# Patient Record
Sex: MALE | Race: WHITE | NOT HISPANIC OR LATINO | Employment: OTHER | URBAN - METROPOLITAN AREA
[De-identification: names, ages, dates, MRNs, and addresses within clinical notes are randomized per-mention and may not be internally consistent; named-entity substitution may affect disease eponyms.]

---

## 2023-03-08 DIAGNOSIS — F84.0 AUTISM SPECTRUM DISORDER (HHS-HCC): ICD-10-CM

## 2023-03-08 RX ORDER — GUANFACINE 2 MG/1
2 TABLET, EXTENDED RELEASE ORAL DAILY
Qty: 90 TABLET | Refills: 0 | Status: SHIPPED | OUTPATIENT
Start: 2023-03-08 | End: 2023-03-22 | Stop reason: SDUPTHER

## 2023-03-08 RX ORDER — GUANFACINE 2 MG/1
2 TABLET, EXTENDED RELEASE ORAL DAILY
COMMUNITY
End: 2023-03-08 | Stop reason: SDUPTHER

## 2023-03-13 PROBLEM — F84.0 AUTISM SPECTRUM DISORDER (HHS-HCC): Status: ACTIVE | Noted: 2023-03-13

## 2023-03-22 ENCOUNTER — TELEMEDICINE (OUTPATIENT)
Dept: PEDIATRICS | Facility: CLINIC | Age: 23
End: 2023-03-22
Payer: COMMERCIAL

## 2023-03-22 DIAGNOSIS — F84.0 AUTISM SPECTRUM DISORDER (HHS-HCC): ICD-10-CM

## 2023-03-22 PROCEDURE — 99213 OFFICE O/P EST LOW 20 MIN: CPT | Performed by: PEDIATRICS

## 2023-03-22 RX ORDER — GUANFACINE 2 MG/1
2 TABLET, EXTENDED RELEASE ORAL DAILY
Qty: 90 TABLET | Refills: 1 | Status: SHIPPED | OUTPATIENT
Start: 2023-03-22 | End: 2023-11-20

## 2023-03-22 NOTE — PROGRESS NOTES
Subjective   Patient ID: Juliocesar Walker is a 23 y.o. male, with  ASD  for which he is treated with  Guanfacine 2mg once daily , who presents today for follow-up via video call.  He is accompanied by his mother..    HPI:  Juliocesar and his mother report that Juliocesar is doing very well on this dose of medication.  He is currently exploring an adult day program at [facility] to perhaps start in the spring.   He is undergoing a waiting process for funding for the program.   The mother is pleased with how he is doing.   He is a little bored because his program at [facility] ended when he turned 23 years old.       Objective   There were no vitals taken for this visit.  Physical Exam  Constitutional:       Appearance: Normal appearance.   HENT:      Nose: Nose normal.   Eyes:      Pupils: Pupils are equal, round, and reactive to light.   Pulmonary:      Effort: Pulmonary effort is normal. No respiratory distress.   Neurological:      Mental Status: He is alert.       Assessment/Plan   Diagnoses and all orders for this visit:  Autism spectrum disorder

## 2023-08-31 ENCOUNTER — OFFICE VISIT (OUTPATIENT)
Dept: PRIMARY CARE | Facility: CLINIC | Age: 23
End: 2023-08-31
Payer: COMMERCIAL

## 2023-08-31 VITALS — SYSTOLIC BLOOD PRESSURE: 112 MMHG | BODY MASS INDEX: 26.19 KG/M2 | DIASTOLIC BLOOD PRESSURE: 60 MMHG | WEIGHT: 161 LBS

## 2023-08-31 DIAGNOSIS — F84.0 AUTISM SPECTRUM DISORDER (HHS-HCC): ICD-10-CM

## 2023-08-31 DIAGNOSIS — F84.0 AUTISM (HHS-HCC): ICD-10-CM

## 2023-08-31 DIAGNOSIS — F41.0 PANIC ATTACK: ICD-10-CM

## 2023-08-31 DIAGNOSIS — G40.909 NONINTRACTABLE EPILEPSY WITHOUT STATUS EPILEPTICUS, UNSPECIFIED EPILEPSY TYPE (MULTI): Primary | ICD-10-CM

## 2023-08-31 PROCEDURE — 99204 OFFICE O/P NEW MOD 45 MIN: CPT | Performed by: STUDENT IN AN ORGANIZED HEALTH CARE EDUCATION/TRAINING PROGRAM

## 2023-08-31 PROCEDURE — 1036F TOBACCO NON-USER: CPT | Performed by: STUDENT IN AN ORGANIZED HEALTH CARE EDUCATION/TRAINING PROGRAM

## 2023-08-31 RX ORDER — HYDROXYZINE HYDROCHLORIDE 25 MG/1
25 TABLET, FILM COATED ORAL 2 TIMES DAILY
Qty: 60 TABLET | Refills: 0 | Status: SHIPPED | OUTPATIENT
Start: 2023-08-31 | End: 2023-08-31 | Stop reason: SDUPTHER

## 2023-08-31 RX ORDER — HYDROXYZINE HYDROCHLORIDE 25 MG/1
25 TABLET, FILM COATED ORAL 2 TIMES DAILY PRN
Qty: 60 TABLET | Refills: 0 | Status: SHIPPED | OUTPATIENT
Start: 2023-08-31 | End: 2023-09-26

## 2023-08-31 ASSESSMENT — ENCOUNTER SYMPTOMS
CARDIOVASCULAR NEGATIVE: 1
PSYCHIATRIC NEGATIVE: 1
RESPIRATORY NEGATIVE: 1
CONSTITUTIONAL NEGATIVE: 1
GASTROINTESTINAL NEGATIVE: 1
MUSCULOSKELETAL NEGATIVE: 1
NEUROLOGICAL NEGATIVE: 1

## 2023-08-31 NOTE — PROGRESS NOTES
Establish as a new patient    Subjective   Patient ID: Juliocesar Walker is a 23 y.o. male.    Patient is a 23-year-old male who presents for [facility] care.  Past medical history includes autism, otherwise no other medical history.  Mom is present with patient who provides most of history.  States patient is doing overall well, mood overall stable, does not follow with a psychiatrist.  Was prescribed guanfacine from his previous PCP, and symptoms have been doing overall well with regards to this medication.  Mother also reports that patient does have outburst at times, does become fixated on a variety of things.  Otherwise this is infrequent, but overall stable.  He regards no additional issues or concerns.      Past medical history as above  Past surgical history denies  Social history denies any alcohol drug or tobacco use  Family history noncontributory                Review of Systems   Constitutional: Negative.    HENT: Negative.     Respiratory: Negative.     Cardiovascular: Negative.    Gastrointestinal: Negative.    Musculoskeletal: Negative.    Skin: Negative.    Neurological: Negative.    Psychiatric/Behavioral: Negative.         Objective      Visit Vitals  /60   Wt 73 kg (161 lb)   BMI 26.19 kg/m²   Smoking Status Never   BSA 1.84 m²      Physical Exam  Vitals and nursing note reviewed.   Constitutional:       General: He is not in acute distress.     Appearance: Normal appearance.   HENT:      Mouth/Throat:      Mouth: Mucous membranes are moist.      Pharynx: Oropharynx is clear. No oropharyngeal exudate.   Eyes:      Extraocular Movements: Extraocular movements intact.      Pupils: Pupils are equal, round, and reactive to light.   Cardiovascular:      Rate and Rhythm: Normal rate and regular rhythm.      Pulses: Normal pulses.      Heart sounds: Normal heart sounds. No murmur heard.  Pulmonary:      Effort: Pulmonary effort is normal. No respiratory distress.   Abdominal:      General: Abdomen is  flat. Bowel sounds are normal. There is no distension.      Tenderness: There is no abdominal tenderness.   Musculoskeletal:         General: Normal range of motion.      Right lower leg: No edema.      Left lower leg: No edema.   Skin:     General: Skin is warm and dry.      Capillary Refill: Capillary refill takes less than 2 seconds.   Neurological:      General: No focal deficit present.      Mental Status: He is alert. Mental status is at baseline.      Cranial Nerves: No cranial nerve deficit.      Motor: No weakness.   Psychiatric:         Mood and Affect: Mood normal.         Thought Content: Thought content normal.         Assessment/Plan   Diagnoses and all orders for this visit:  Nonintractable epilepsy without status epilepticus, unspecified epilepsy type (CMS/HCC)  -     Referral to Neurology; Future  Autism  -     Referral to Psychiatry; Future  -     Referral to [facility]; Future  Panic attack  -     hydrOXYzine HCL (Atarax) 25 mg tablet; Take 1 tablet (25 mg) by mouth 2 times a day as needed for anxiety.  Autism spectrum disorder    23-year-old male here to establish care    #Autism  Referral [facility] psych and [facility]  Continue medications from previous provider  Closely monitor symptoms  Defer lab work at this time    #Panic attacks  Intermittently gets episodes of panic attacks and agitation, hydroxyzine as needed, referrals as above    #Health maintenance  Advise age-appropriate vaccinations  Referral to psychiatry    Return to care in 3 to 6 months mother updated with plan of care

## 2023-10-16 DIAGNOSIS — F84.0 AUTISM SPECTRUM DISORDER (HHS-HCC): ICD-10-CM

## 2023-10-16 RX ORDER — GUANFACINE 2 MG/1
2 TABLET, EXTENDED RELEASE ORAL DAILY
Qty: 90 TABLET | Refills: 1 | OUTPATIENT
Start: 2023-10-16

## 2023-11-18 DIAGNOSIS — F84.0 AUTISM SPECTRUM DISORDER (HHS-HCC): ICD-10-CM

## 2023-11-20 RX ORDER — GUANFACINE 2 MG/1
2 TABLET, EXTENDED RELEASE ORAL DAILY
Qty: 90 TABLET | Refills: 1 | Status: SHIPPED | OUTPATIENT
Start: 2023-11-20 | End: 2023-12-15

## 2023-12-15 DIAGNOSIS — F84.0 AUTISM SPECTRUM DISORDER (HHS-HCC): ICD-10-CM

## 2023-12-15 RX ORDER — GUANFACINE 2 MG/1
2 TABLET, EXTENDED RELEASE ORAL DAILY
Qty: 30 TABLET | Refills: 8 | Status: SHIPPED | OUTPATIENT
Start: 2023-12-15 | End: 2024-01-01 | Stop reason: SDUPTHER

## 2023-12-28 ENCOUNTER — TELEPHONE (OUTPATIENT)
Dept: PRIMARY CARE | Facility: CLINIC | Age: 23
End: 2023-12-28
Payer: COMMERCIAL

## 2023-12-28 DIAGNOSIS — F84.0 AUTISM SPECTRUM DISORDER (HHS-HCC): ICD-10-CM

## 2023-12-28 NOTE — TELEPHONE ENCOUNTER
Looking to get a refill for 2 mg guanfacine [facility] er 24 hour tablet.  Will also need you to redo some paperwork that wasn't filled out correctly but will fax that over

## 2024-01-01 RX ORDER — GUANFACINE 2 MG/1
2 TABLET, EXTENDED RELEASE ORAL DAILY
Qty: 90 TABLET | Refills: 0 | Status: SHIPPED | OUTPATIENT
Start: 2024-01-01 | End: 2024-05-16 | Stop reason: HOSPADM

## 2024-02-16 ENCOUNTER — APPOINTMENT (OUTPATIENT)
Dept: PRIMARY CARE | Facility: CLINIC | Age: 24
End: 2024-02-16
Payer: COMMERCIAL

## 2024-02-16 ENCOUNTER — HOSPITAL ENCOUNTER (EMERGENCY)
Facility: HOSPITAL | Age: 24
Discharge: STILL ADMITTED OR ADMINISTRATIVE | End: 2024-02-16
Payer: COMMERCIAL

## 2024-02-16 ENCOUNTER — APPOINTMENT (OUTPATIENT)
Dept: CARDIOLOGY | Facility: HOSPITAL | Age: 24
End: 2024-02-16
Payer: COMMERCIAL

## 2024-02-16 ENCOUNTER — HOSPITAL ENCOUNTER (EMERGENCY)
Facility: HOSPITAL | Age: 24
Discharge: OTHER INSTITUTION | End: 2024-02-17
Attending: EMERGENCY MEDICINE
Payer: COMMERCIAL

## 2024-02-16 DIAGNOSIS — R46.89 AGGRESSIVE BEHAVIOR: Primary | ICD-10-CM

## 2024-02-16 LAB
ALBUMIN SERPL BCP-MCNC: 4.7 G/DL (ref 3.4–5)
ALP SERPL-CCNC: 54 U/L (ref 33–120)
ALT SERPL W P-5'-P-CCNC: 33 U/L (ref 10–52)
AMPHETAMINES UR QL SCN: NORMAL
ANION GAP SERPL CALC-SCNC: 14 MMOL/L (ref 10–20)
APAP SERPL-MCNC: <10 UG/ML
AST SERPL W P-5'-P-CCNC: 22 U/L (ref 9–39)
BARBITURATES UR QL SCN: NORMAL
BASOPHILS # BLD AUTO: 0.04 X10*3/UL (ref 0–0.1)
BASOPHILS NFR BLD AUTO: 0.5 %
BENZODIAZ UR QL SCN: NORMAL
BILIRUB SERPL-MCNC: 0.7 MG/DL (ref 0–1.2)
BUN SERPL-MCNC: 10 MG/DL (ref 6–23)
BZE UR QL SCN: NORMAL
CALCIUM SERPL-MCNC: 9.7 MG/DL (ref 8.6–10.3)
CANNABINOIDS UR QL SCN: NORMAL
CHLORIDE SERPL-SCNC: 105 MMOL/L (ref 98–107)
CO2 SERPL-SCNC: 26 MMOL/L (ref 21–32)
CREAT SERPL-MCNC: 0.89 MG/DL (ref 0.5–1.3)
EGFRCR SERPLBLD CKD-EPI 2021: >90 ML/MIN/1.73M*2
EOSINOPHIL # BLD AUTO: 0.13 X10*3/UL (ref 0–0.7)
EOSINOPHIL NFR BLD AUTO: 1.7 %
ERYTHROCYTE [DISTWIDTH] IN BLOOD BY AUTOMATED COUNT: 11.9 % (ref 11.5–14.5)
ETHANOL SERPL-MCNC: <10 MG/DL
FENTANYL+NORFENTANYL UR QL SCN: NORMAL
GLUCOSE SERPL-MCNC: 102 MG/DL (ref 74–99)
HCT VFR BLD AUTO: 44.5 % (ref 41–52)
HGB BLD-MCNC: 15.5 G/DL (ref 13.5–17.5)
IMM GRANULOCYTES # BLD AUTO: 0.04 X10*3/UL (ref 0–0.7)
IMM GRANULOCYTES NFR BLD AUTO: 0.5 % (ref 0–0.9)
LYMPHOCYTES # BLD AUTO: 1.63 X10*3/UL (ref 1.2–4.8)
LYMPHOCYTES NFR BLD AUTO: 21.1 %
MCH RBC QN AUTO: 30 PG (ref 26–34)
MCHC RBC AUTO-ENTMCNC: 34.8 G/DL (ref 32–36)
MCV RBC AUTO: 86 FL (ref 80–100)
MONOCYTES # BLD AUTO: 0.6 X10*3/UL (ref 0.1–1)
MONOCYTES NFR BLD AUTO: 7.8 %
NEUTROPHILS # BLD AUTO: 5.3 X10*3/UL (ref 1.2–7.7)
NEUTROPHILS NFR BLD AUTO: 68.4 %
NRBC BLD-RTO: 0 /100 WBCS (ref 0–0)
OPIATES UR QL SCN: NORMAL
OXYCODONE+OXYMORPHONE UR QL SCN: NORMAL
PCP UR QL SCN: NORMAL
PLATELET # BLD AUTO: 250 X10*3/UL (ref 150–450)
POTASSIUM SERPL-SCNC: 4.2 MMOL/L (ref 3.5–5.3)
PROT SERPL-MCNC: 7.2 G/DL (ref 6.4–8.2)
RBC # BLD AUTO: 5.16 X10*6/UL (ref 4.5–5.9)
SALICYLATES SERPL-MCNC: <3 MG/DL
SODIUM SERPL-SCNC: 141 MMOL/L (ref 136–145)
WBC # BLD AUTO: 7.7 X10*3/UL (ref 4.4–11.3)

## 2024-02-16 PROCEDURE — 80307 DRUG TEST PRSMV CHEM ANLYZR: CPT | Performed by: EMERGENCY MEDICINE

## 2024-02-16 PROCEDURE — 2500000001 HC RX 250 WO HCPCS SELF ADMINISTERED DRUGS (ALT 637 FOR MEDICARE OP): Performed by: EMERGENCY MEDICINE

## 2024-02-16 PROCEDURE — 4500999001 HC ED NO CHARGE

## 2024-02-16 PROCEDURE — 99285 EMERGENCY DEPT VISIT HI MDM: CPT | Mod: 25 | Performed by: EMERGENCY MEDICINE

## 2024-02-16 PROCEDURE — 85025 COMPLETE CBC W/AUTO DIFF WBC: CPT | Performed by: EMERGENCY MEDICINE

## 2024-02-16 PROCEDURE — 80320 DRUG SCREEN QUANTALCOHOLS: CPT | Performed by: EMERGENCY MEDICINE

## 2024-02-16 PROCEDURE — 93005 ELECTROCARDIOGRAM TRACING: CPT

## 2024-02-16 PROCEDURE — 80053 COMPREHEN METABOLIC PANEL: CPT | Performed by: EMERGENCY MEDICINE

## 2024-02-16 PROCEDURE — 36415 COLL VENOUS BLD VENIPUNCTURE: CPT | Performed by: EMERGENCY MEDICINE

## 2024-02-16 PROCEDURE — 99283 EMERGENCY DEPT VISIT LOW MDM: CPT

## 2024-02-16 RX ORDER — GUANFACINE 1 MG/1
2 TABLET ORAL ONCE
Status: COMPLETED | OUTPATIENT
Start: 2024-02-16 | End: 2024-02-16

## 2024-02-16 RX ORDER — RISPERIDONE 0.5 MG/1
0.5 TABLET, ORALLY DISINTEGRATING ORAL ONCE AS NEEDED
Status: COMPLETED | OUTPATIENT
Start: 2024-02-16 | End: 2024-02-16

## 2024-02-16 RX ADMIN — GUANFACINE 2 MG: 1 TABLET ORAL at 20:40

## 2024-02-16 RX ADMIN — RISPERIDONE 0.5 MG: 0.5 TABLET, ORALLY DISINTEGRATING ORAL at 22:27

## 2024-02-16 SDOH — SOCIAL STABILITY: SOCIAL NETWORK: PARENT/GUARDIAN/SIGNIFICANT OTHER INVOLVEMENT: OTHER (COMMENT)

## 2024-02-16 SDOH — HEALTH STABILITY: MENTAL HEALTH: BEHAVIORS/MOOD: ANXIOUS

## 2024-02-16 SDOH — SOCIAL STABILITY: SOCIAL INSECURITY: FAMILY BEHAVIORS: ANXIOUS

## 2024-02-16 ASSESSMENT — PAIN SCALES - GENERAL
PAINLEVEL_OUTOF10: 0 - NO PAIN
PAINLEVEL_OUTOF10: 0 - NO PAIN

## 2024-02-16 ASSESSMENT — PAIN - FUNCTIONAL ASSESSMENT
PAIN_FUNCTIONAL_ASSESSMENT: 0-10
PAIN_FUNCTIONAL_ASSESSMENT: 0-10

## 2024-02-16 ASSESSMENT — PAIN DESCRIPTION - PROGRESSION: CLINICAL_PROGRESSION: NOT CHANGED

## 2024-02-16 ASSESSMENT — LIFESTYLE VARIABLES
EVER FELT BAD OR GUILTY ABOUT YOUR DRINKING: NO
EVER HAD A DRINK FIRST THING IN THE MORNING TO STEADY YOUR NERVES TO GET RID OF A HANGOVER: NO
HAVE YOU EVER FELT YOU SHOULD CUT DOWN ON YOUR DRINKING: NO
HAVE PEOPLE ANNOYED YOU BY CRITICIZING YOUR DRINKING: NO
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
## 2024-02-17 VITALS
BODY MASS INDEX: 25.71 KG/M2 | HEIGHT: 66 IN | SYSTOLIC BLOOD PRESSURE: 124 MMHG | OXYGEN SATURATION: 98 % | DIASTOLIC BLOOD PRESSURE: 87 MMHG | HEART RATE: 80 BPM | RESPIRATION RATE: 18 BRPM | TEMPERATURE: 98.1 F | WEIGHT: 160 LBS

## 2024-02-17 LAB — SARS-COV-2 RNA RESP QL NAA+PROBE: NOT DETECTED

## 2024-02-17 PROCEDURE — 2500000001 HC RX 250 WO HCPCS SELF ADMINISTERED DRUGS (ALT 637 FOR MEDICARE OP): Performed by: STUDENT IN AN ORGANIZED HEALTH CARE EDUCATION/TRAINING PROGRAM

## 2024-02-17 PROCEDURE — 2500000001 HC RX 250 WO HCPCS SELF ADMINISTERED DRUGS (ALT 637 FOR MEDICARE OP): Performed by: INTERNAL MEDICINE

## 2024-02-17 PROCEDURE — 87635 SARS-COV-2 COVID-19 AMP PRB: CPT | Performed by: EMERGENCY MEDICINE

## 2024-02-17 RX ORDER — HYDROXYZINE HYDROCHLORIDE 25 MG/1
25 TABLET, FILM COATED ORAL ONCE
Status: COMPLETED | OUTPATIENT
Start: 2024-02-17 | End: 2024-02-17

## 2024-02-17 RX ORDER — LORAZEPAM 0.5 MG/1
1 TABLET ORAL ONCE
Status: COMPLETED | OUTPATIENT
Start: 2024-02-17 | End: 2024-02-17

## 2024-02-17 RX ADMIN — LORAZEPAM 1 MG: 0.5 TABLET ORAL at 11:50

## 2024-02-17 RX ADMIN — HYDROXYZINE HYDROCHLORIDE 25 MG: 25 TABLET ORAL at 01:33

## 2024-02-17 SDOH — HEALTH STABILITY: MENTAL HEALTH: BEHAVIORS/MOOD: SLEEPING

## 2024-02-17 SDOH — HEALTH STABILITY: MENTAL HEALTH: BEHAVIORS/MOOD: COOPERATIVE

## 2024-02-17 SDOH — SOCIAL STABILITY: SOCIAL NETWORK: VISITOR BEHAVIORS: UNABLE TO ASSESS

## 2024-02-17 SDOH — HEALTH STABILITY: MENTAL HEALTH: HALLUCINATION: UNABLE TO ASSESS

## 2024-02-17 SDOH — SOCIAL STABILITY: SOCIAL NETWORK: EMOTIONAL SUPPORT GIVEN: OTHER (COMMENT)

## 2024-02-17 SDOH — HEALTH STABILITY: MENTAL HEALTH: CONTENT: UNREMARKABLE

## 2024-02-17 SDOH — HEALTH STABILITY: MENTAL HEALTH: NEEDS EXPRESSED: DENIES

## 2024-02-17 SDOH — SOCIAL STABILITY: SOCIAL INSECURITY: FAMILY BEHAVIORS: UNABLE TO ASSESS

## 2024-02-17 SDOH — HEALTH STABILITY: MENTAL HEALTH: SLEEP PATTERN: SLEEPS ALL NIGHT

## 2024-02-17 SDOH — HEALTH STABILITY: MENTAL HEALTH: BEHAVIORS/MOOD: ANXIOUS;PACING

## 2024-02-17 SDOH — HEALTH STABILITY: MENTAL HEALTH: CONTENT: UNABLE TO ASSESS

## 2024-02-17 SDOH — SOCIAL STABILITY: SOCIAL NETWORK: VISITOR BEHAVIORS: OTHER (COMMENT)

## 2024-02-17 SDOH — SOCIAL STABILITY: SOCIAL INSECURITY: FAMILY BEHAVIORS: APPROPRIATE FOR SITUATION

## 2024-02-17 SDOH — HEALTH STABILITY: MENTAL HEALTH: SUICIDE ASSESSMENT: ADULT (C-SSRS)

## 2024-02-17 SDOH — SOCIAL STABILITY: SOCIAL NETWORK: PARENT/GUARDIAN/SIGNIFICANT OTHER INVOLVEMENT: OTHER (COMMENT)

## 2024-02-17 SDOH — SOCIAL STABILITY: SOCIAL NETWORK: PARENT/GUARDIAN/SIGNIFICANT OTHER INVOLVEMENT: ATTENTIVE TO PATIENT NEEDS

## 2024-02-17 SDOH — HEALTH STABILITY: MENTAL HEALTH: HAVE YOU WISHED YOU WERE DEAD OR WISHED YOU COULD GO TO SLEEP AND NOT WAKE UP?: NO

## 2024-02-17 SDOH — HEALTH STABILITY: MENTAL HEALTH: HAVE YOU ACTUALLY HAD ANY THOUGHTS OF KILLING YOURSELF?: NO

## 2024-02-17 SDOH — HEALTH STABILITY: MENTAL HEALTH: DELUSIONS: CONTROLLED

## 2024-02-17 SDOH — HEALTH STABILITY: MENTAL HEALTH: HAVE YOU EVER DONE ANYTHING, STARTED TO DO ANYTHING, OR PREPARED TO DO ANYTHING TO END YOUR LIFE?: NO

## 2024-02-17 SDOH — HEALTH STABILITY: MENTAL HEALTH: BEHAVIORS/MOOD: COOPERATIVE;CALM

## 2024-02-17 SDOH — HEALTH STABILITY: MENTAL HEALTH: BEHAVIORS/MOOD: APPROPRIATE FOR AGE

## 2024-02-17 SDOH — HEALTH STABILITY: MENTAL HEALTH: BEHAVIORS/MOOD: CALM

## 2024-02-17 SDOH — HEALTH STABILITY: MENTAL HEALTH

## 2024-02-17 ASSESSMENT — PAIN - FUNCTIONAL ASSESSMENT
PAIN_FUNCTIONAL_ASSESSMENT: 0-10
PAIN_FUNCTIONAL_ASSESSMENT: 0-10

## 2024-02-17 ASSESSMENT — PAIN SCALES - GENERAL
PAINLEVEL_OUTOF10: 0 - NO PAIN
PAINLEVEL_OUTOF10: 0 - NO PAIN
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
## 2024-02-17 NOTE — PROGRESS NOTES
Pharmacy Medication History Review    Juliocesar Walker is a 24 y.o. male admitted for No Principal Problem: There is no principal problem currently on the Problem List. Please update the Problem List and refresh.. Pharmacy reviewed the patient's [id number] medications and allergies for accuracy.    The list below reflectives the updated PTA list. Please review each medication in order reconciliation for additional clarification and justification.  Prior to Admission medications    Medication Sig Start Date End Date Taking? Authorizing Provider   guanFACINE (Intuniv) 2 mg 24 hr tablet Take 1 tablet (2 mg) by mouth once daily. 1/1/24 3/31/24 Yes Marco Thompson,    hydrOXYzine HCL (Atarax) 25 mg tablet TAKE 1 TABLET BY MOUTH TWICE A DAY  Patient taking differently: Take 1 tablet (25 mg) by mouth 2 times a day as needed for anxiety. 9/26/23  No Marco Thompson DO        The list below reflectives the updated allergy list. Please review each documented allergy for additional clarification and justification.  Allergies  Reviewed by Cammie Brown RN on 2/16/2024   No Known Allergies         Below are additional concerns with the patient's PTA list.        Karissa Marion CPhT
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
## 2024-02-24 LAB
ATRIAL RATE: 59 BPM
P AXIS: 58 DEGREES
P OFFSET: 193 MS
P ONSET: 142 MS
PR INTERVAL: 160 MS
Q ONSET: 222 MS
QRS COUNT: 9 BEATS
QRS DURATION: 88 MS
QT INTERVAL: 390 MS
QTC CALCULATION(BAZETT): 386 MS
QTC FREDERICIA: 388 MS
R AXIS: -8 DEGREES
T AXIS: 33 DEGREES
T OFFSET: 417 MS
VENTRICULAR RATE: 59 BPM

## 2024-04-03 ENCOUNTER — HOSPITAL ENCOUNTER (OUTPATIENT)
Dept: CARDIOLOGY | Facility: HOSPITAL | Age: 24
Discharge: HOME | End: 2024-04-03
Payer: COMMERCIAL

## 2024-04-03 ENCOUNTER — HOSPITAL ENCOUNTER (EMERGENCY)
Facility: HOSPITAL | Age: 24
Discharge: OTHER INSTITUTION | End: 2024-04-04
Attending: STUDENT IN AN ORGANIZED HEALTH CARE EDUCATION/TRAINING PROGRAM
Payer: COMMERCIAL

## 2024-04-03 DIAGNOSIS — R46.89 AGGRESSIVE BEHAVIOR: Primary | ICD-10-CM

## 2024-04-03 DIAGNOSIS — Z00.8 ENCOUNTER FOR PSYCHOLOGICAL EVALUATION: ICD-10-CM

## 2024-04-03 LAB
ALBUMIN SERPL BCP-MCNC: 4.8 G/DL (ref 3.4–5)
ALP SERPL-CCNC: 56 U/L (ref 33–120)
ALT SERPL W P-5'-P-CCNC: 38 U/L (ref 10–52)
ANION GAP SERPL CALC-SCNC: 13 MMOL/L (ref 10–20)
APAP SERPL-MCNC: <10 UG/ML
AST SERPL W P-5'-P-CCNC: 18 U/L (ref 9–39)
BASOPHILS # BLD AUTO: 0.03 X10*3/UL (ref 0–0.1)
BASOPHILS NFR BLD AUTO: 0.3 %
BILIRUB SERPL-MCNC: 0.6 MG/DL (ref 0–1.2)
BUN SERPL-MCNC: 11 MG/DL (ref 6–23)
CALCIUM SERPL-MCNC: 9.6 MG/DL (ref 8.6–10.3)
CHLORIDE SERPL-SCNC: 103 MMOL/L (ref 98–107)
CO2 SERPL-SCNC: 26 MMOL/L (ref 21–32)
CREAT SERPL-MCNC: 0.85 MG/DL (ref 0.5–1.3)
EGFRCR SERPLBLD CKD-EPI 2021: >90 ML/MIN/1.73M*2
EOSINOPHIL # BLD AUTO: 0.12 X10*3/UL (ref 0–0.7)
EOSINOPHIL NFR BLD AUTO: 1.4 %
ERYTHROCYTE [DISTWIDTH] IN BLOOD BY AUTOMATED COUNT: 11.8 % (ref 11.5–14.5)
ETHANOL SERPL-MCNC: <10 MG/DL
GLUCOSE SERPL-MCNC: 128 MG/DL (ref 74–99)
HCT VFR BLD AUTO: 42.6 % (ref 41–52)
HGB BLD-MCNC: 15.3 G/DL (ref 13.5–17.5)
HOLD SPECIMEN: NORMAL
IMM GRANULOCYTES # BLD AUTO: 0.12 X10*3/UL (ref 0–0.7)
IMM GRANULOCYTES NFR BLD AUTO: 1.4 % (ref 0–0.9)
LYMPHOCYTES # BLD AUTO: 2.09 X10*3/UL (ref 1.2–4.8)
LYMPHOCYTES NFR BLD AUTO: 23.8 %
MCH RBC QN AUTO: 29.9 PG (ref 26–34)
MCHC RBC AUTO-ENTMCNC: 35.9 G/DL (ref 32–36)
MCV RBC AUTO: 83 FL (ref 80–100)
MONOCYTES # BLD AUTO: 0.56 X10*3/UL (ref 0.1–1)
MONOCYTES NFR BLD AUTO: 6.4 %
NEUTROPHILS # BLD AUTO: 5.85 X10*3/UL (ref 1.2–7.7)
NEUTROPHILS NFR BLD AUTO: 66.7 %
NRBC BLD-RTO: 0 /100 WBCS (ref 0–0)
PLATELET # BLD AUTO: 288 X10*3/UL (ref 150–450)
POTASSIUM SERPL-SCNC: 3.7 MMOL/L (ref 3.5–5.3)
PROT SERPL-MCNC: 7.8 G/DL (ref 6.4–8.2)
RBC # BLD AUTO: 5.12 X10*6/UL (ref 4.5–5.9)
SALICYLATES SERPL-MCNC: <3 MG/DL
SODIUM SERPL-SCNC: 138 MMOL/L (ref 136–145)
WBC # BLD AUTO: 8.8 X10*3/UL (ref 4.4–11.3)

## 2024-04-03 PROCEDURE — 87635 SARS-COV-2 COVID-19 AMP PRB: CPT | Performed by: STUDENT IN AN ORGANIZED HEALTH CARE EDUCATION/TRAINING PROGRAM

## 2024-04-03 PROCEDURE — 85025 COMPLETE CBC W/AUTO DIFF WBC: CPT | Performed by: STUDENT IN AN ORGANIZED HEALTH CARE EDUCATION/TRAINING PROGRAM

## 2024-04-03 PROCEDURE — 80307 DRUG TEST PRSMV CHEM ANLYZR: CPT | Performed by: STUDENT IN AN ORGANIZED HEALTH CARE EDUCATION/TRAINING PROGRAM

## 2024-04-03 PROCEDURE — 84075 ASSAY ALKALINE PHOSPHATASE: CPT | Performed by: STUDENT IN AN ORGANIZED HEALTH CARE EDUCATION/TRAINING PROGRAM

## 2024-04-03 PROCEDURE — 36415 COLL VENOUS BLD VENIPUNCTURE: CPT | Performed by: STUDENT IN AN ORGANIZED HEALTH CARE EDUCATION/TRAINING PROGRAM

## 2024-04-03 PROCEDURE — 99285 EMERGENCY DEPT VISIT HI MDM: CPT

## 2024-04-03 PROCEDURE — 93005 ELECTROCARDIOGRAM TRACING: CPT

## 2024-04-03 PROCEDURE — 2500000001 HC RX 250 WO HCPCS SELF ADMINISTERED DRUGS (ALT 637 FOR MEDICARE OP): Performed by: STUDENT IN AN ORGANIZED HEALTH CARE EDUCATION/TRAINING PROGRAM

## 2024-04-03 PROCEDURE — 81003 URINALYSIS AUTO W/O SCOPE: CPT | Performed by: STUDENT IN AN ORGANIZED HEALTH CARE EDUCATION/TRAINING PROGRAM

## 2024-04-03 PROCEDURE — 80179 DRUG ASSAY SALICYLATE: CPT | Performed by: STUDENT IN AN ORGANIZED HEALTH CARE EDUCATION/TRAINING PROGRAM

## 2024-04-03 RX ORDER — RISPERIDONE 0.25 MG/1
0.5 TABLET ORAL 2 TIMES DAILY
Status: DISCONTINUED | OUTPATIENT
Start: 2024-04-03 | End: 2024-04-04 | Stop reason: HOSPADM

## 2024-04-03 RX ORDER — SERTRALINE HYDROCHLORIDE 50 MG/1
25 TABLET, FILM COATED ORAL DAILY
Status: DISCONTINUED | OUTPATIENT
Start: 2024-04-03 | End: 2024-04-04 | Stop reason: HOSPADM

## 2024-04-03 RX ORDER — DIPHENHYDRAMINE HCL 25 MG
50 CAPSULE ORAL NIGHTLY PRN
Status: DISCONTINUED | OUTPATIENT
Start: 2024-04-03 | End: 2024-04-04 | Stop reason: HOSPADM

## 2024-04-03 RX ORDER — GUANFACINE 1 MG/1
2 TABLET, EXTENDED RELEASE ORAL DAILY
Status: DISCONTINUED | OUTPATIENT
Start: 2024-04-04 | End: 2024-04-03

## 2024-04-03 RX ORDER — LORAZEPAM 0.5 MG/1
1 TABLET ORAL ONCE
Status: COMPLETED | OUTPATIENT
Start: 2024-04-03 | End: 2024-04-03

## 2024-04-03 RX ORDER — GUANFACINE 1 MG/1
2 TABLET ORAL NIGHTLY
Status: DISCONTINUED | OUTPATIENT
Start: 2024-04-03 | End: 2024-04-03

## 2024-04-03 RX ORDER — GUANFACINE 1 MG/1
2 TABLET ORAL EVERY MORNING
Status: DISCONTINUED | OUTPATIENT
Start: 2024-04-04 | End: 2024-04-04 | Stop reason: HOSPADM

## 2024-04-03 RX ADMIN — LORAZEPAM 1 MG: 0.5 TABLET ORAL at 22:46

## 2024-04-03 RX ADMIN — GUANFACINE 2 MG: 1 TABLET ORAL at 22:46

## 2024-04-03 SDOH — SOCIAL STABILITY: SOCIAL NETWORK: PARENT/GUARDIAN/SIGNIFICANT OTHER INVOLVEMENT: NO INVOLVEMENT

## 2024-04-03 SDOH — HEALTH STABILITY: MENTAL HEALTH: SLEEP PATTERN: DISTURBED/INTERRUPTED SLEEP

## 2024-04-03 SDOH — HEALTH STABILITY: MENTAL HEALTH: BEHAVIORS/MOOD: ANXIOUS

## 2024-04-03 ASSESSMENT — COLUMBIA-SUICIDE SEVERITY RATING SCALE - C-SSRS
1. IN THE PAST MONTH, HAVE YOU WISHED YOU WERE DEAD OR WISHED YOU COULD GO TO SLEEP AND NOT WAKE UP?: NO
2. HAVE YOU ACTUALLY HAD ANY THOUGHTS OF KILLING YOURSELF?: NO
6. HAVE YOU EVER DONE ANYTHING, STARTED TO DO ANYTHING, OR PREPARED TO DO ANYTHING TO END YOUR LIFE?: NO

## 2024-04-03 ASSESSMENT — PAIN - FUNCTIONAL ASSESSMENT: PAIN_FUNCTIONAL_ASSESSMENT: 0-10

## 2024-04-03 ASSESSMENT — PAIN SCALES - GENERAL
PAINLEVEL_OUTOF10: 0 - NO PAIN
PAINLEVEL_OUTOF10: 0 - NO PAIN
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
## 2024-04-04 ENCOUNTER — APPOINTMENT (OUTPATIENT)
Dept: CARDIOLOGY | Facility: HOSPITAL | Age: 24
End: 2024-04-04
Payer: COMMERCIAL

## 2024-04-04 VITALS
RESPIRATION RATE: 19 BRPM | WEIGHT: 150 LBS | DIASTOLIC BLOOD PRESSURE: 91 MMHG | OXYGEN SATURATION: 100 % | SYSTOLIC BLOOD PRESSURE: 131 MMHG | HEART RATE: 111 BPM | HEIGHT: 64 IN | TEMPERATURE: 97.5 F | BODY MASS INDEX: 25.61 KG/M2

## 2024-04-04 LAB
AMPHETAMINES UR QL SCN: NORMAL
APPEARANCE UR: CLEAR
BARBITURATES UR QL SCN: NORMAL
BENZODIAZ UR QL SCN: NORMAL
BILIRUB UR STRIP.AUTO-MCNC: NEGATIVE MG/DL
BZE UR QL SCN: NORMAL
CANNABINOIDS UR QL SCN: NORMAL
COLOR UR: NORMAL
FENTANYL+NORFENTANYL UR QL SCN: NORMAL
GLUCOSE UR STRIP.AUTO-MCNC: NEGATIVE MG/DL
KETONES UR STRIP.AUTO-MCNC: NEGATIVE MG/DL
LEUKOCYTE ESTERASE UR QL STRIP.AUTO: NEGATIVE
METHADONE UR QL SCN: NORMAL
NITRITE UR QL STRIP.AUTO: NEGATIVE
OPIATES UR QL SCN: NORMAL
OXYCODONE+OXYMORPHONE UR QL SCN: NORMAL
PCP UR QL SCN: NORMAL
PH UR STRIP.AUTO: 6 [PH]
PROT UR STRIP.AUTO-MCNC: NEGATIVE MG/DL
RBC # UR STRIP.AUTO: NEGATIVE /UL
SARS-COV-2 RNA RESP QL NAA+PROBE: NOT DETECTED
SP GR UR STRIP.AUTO: 1.01
UROBILINOGEN UR STRIP.AUTO-MCNC: <2 MG/DL

## 2024-04-04 PROCEDURE — 2500000002 HC RX 250 W HCPCS SELF ADMINISTERED DRUGS (ALT 637 FOR MEDICARE OP, ALT 636 FOR OP/ED): Performed by: STUDENT IN AN ORGANIZED HEALTH CARE EDUCATION/TRAINING PROGRAM

## 2024-04-04 PROCEDURE — 93005 ELECTROCARDIOGRAM TRACING: CPT

## 2024-04-04 PROCEDURE — 2500000001 HC RX 250 WO HCPCS SELF ADMINISTERED DRUGS (ALT 637 FOR MEDICARE OP): Performed by: STUDENT IN AN ORGANIZED HEALTH CARE EDUCATION/TRAINING PROGRAM

## 2024-04-04 RX ADMIN — RISPERIDONE 0.5 MG: 0.25 TABLET, FILM COATED ORAL at 00:26

## 2024-04-04 RX ADMIN — RISPERIDONE 0.5 MG: 0.25 TABLET, FILM COATED ORAL at 09:00

## 2024-04-04 RX ADMIN — SERTRALINE HYDROCHLORIDE 25 MG: 50 TABLET ORAL at 00:26

## 2024-04-04 RX ADMIN — GUANFACINE 2 MG: 1 TABLET ORAL at 09:00

## 2024-04-04 RX ADMIN — DIPHENHYDRAMINE HYDROCHLORIDE 50 MG: 25 CAPSULE ORAL at 00:26

## 2024-04-04 SDOH — HEALTH STABILITY: MENTAL HEALTH: BEHAVIORS/MOOD: ANXIOUS

## 2024-04-04 SDOH — SOCIAL STABILITY: SOCIAL NETWORK: PARENT/GUARDIAN/SIGNIFICANT OTHER INVOLVEMENT: NO INVOLVEMENT

## 2024-04-04 SDOH — HEALTH STABILITY: MENTAL HEALTH: BEHAVIORS/MOOD: SLEEPING

## 2024-04-04 SDOH — HEALTH STABILITY: MENTAL HEALTH: SLEEP PATTERN: DISTURBED/INTERRUPTED SLEEP

## 2024-04-04 ASSESSMENT — PAIN - FUNCTIONAL ASSESSMENT: PAIN_FUNCTIONAL_ASSESSMENT: 0-10

## 2024-04-04 ASSESSMENT — PAIN SCALES - GENERAL: PAINLEVEL_OUTOF10: 0 - NO PAIN

## 2024-04-04 NOTE — ED NOTES
Report called to Aleida mercado at [facility] pt  time 1340.     Sonya Mena RN  04/04/24 1509

## 2024-04-04 NOTE — PROGRESS NOTES
In short this is a 24-year-old male presenting to the emergency department for psychiatric evaluation with past medical history of autism.  He was seen by the previous physician and medically cleared.  At that time they had been evaluated by EPAT and they were recommending admission for inpatient treatment.  Patient was signed out to me to follow-up on EPAT recommendation for his facility.  Patient has been calm cooperative under my care he is given food remains hemodynamically stable signed out to the oncoming physician still awaiting EPAT recommendations.    ED Course as of 04/04/24 0647 Wed Apr 03, 2024 1858 EKG as interpreted by myself: Rate 91, , QRS 80, QTc 423.  No STEMI or ischemic changes noted.  Impression normal sinus rhythm. []   1920 CBC and Auto Differential(!)  No leukocytosis, reviewed and stable []   1929 Comprehensive Metabolic Panel(!)  Reviewed wnl [AH]   1929 Acute Toxicology Panel, Blood  wnl []      ED Course User Index  [] Ynes Aaron MD         Diagnoses as of 04/04/24 0647   Aggressive behavior

## 2024-04-04 NOTE — PROGRESS NOTES
This patient was seen by the offgoing provider.  Please see their note for full history and physical exam.    Briefly, this is a 24 y.o. male presenting to the ED with aggressive behavior.  The patient was medically cleared prior to my assumption of his care.  He was excepted to clear for staff for inpatient psychiatric hospitalization.  He remained stable and was transferred without additional issue.        Mark Gloria MD  Emergency Medicine Attending
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
## 2024-04-04 NOTE — SIGNIFICANT EVENT
Application for Emergency Admission      Ready for Transfer?  Is the patient medically cleared for transfer to inpatient psychiatry: Yes  Has the patient been accepted to an inpatient psychiatric hospital: Yes    Application for Emergency Admission  IN ACCORDANCE [place]  The Chief Clinical Officer of: [facility] [place] [facility] 4/4/2024 .8:52 AM    Reason for Hospitalization  The undersigned has reason to believe that: Juliocesar Walker Is a mentally ill person subject to hospitalization by court order under division B Section 5122.01 of the Revised Code, i.e., this person:    1.No  Represents a substantial risk of physical harm to self as manifested by evidence of threats of, or attempts at, suicide or serious self-inflicted bodily harm    2.Yes Represents a substantial risk of physical harm to others as manifested by evidence of recent homicidal or other violent behavior, evidence of recent threats that place another in reasonable fear of violent behavior and serious physical harm, or other evidence of present dangerousness    3.No Represents a substantial and immediate risk of serious physical impairment or injury to self as manifested by  evidence that the person is unable to provide for and is not providing for the person's basic physical needs because of the person's mental illness and that appropriate provision for those needs cannot be made  immediately available in the community    4.Yes Would benefit from treatment in a hospital for his mental illness and is in need of such treatment as manifested by evidence of behavior that creates a grave and imminent risk to substantial rights of others or  himself.    5.No Would benefit from treatment as manifested by evidence of behavior that indicates all of the following:       (a) The person is unlikely to survive safely in the community without supervision, based on a clinical determination.       (b) The person has a history of lack of compliance with treatment  for mental illness and one of the following applies:      (i) At least twice within the thirty-six months prior to the filing of an affidavit seeking court-ordered treatment of the person under section 5122.111 of the Revised Code, the lack of compliance has been a significant factor in necessitating hospitalization in a hospital or receipt of services in a forensic or other mental health unit of a correctional facility, provided that the thirty-six-month period shall be extended by the length of any hospitalization or incarceration of the person that occurred within the thirty-six-month period.      (ii) Within the forty-eight months prior to the filing of an affidavit seeking court-ordered treatment of the person under section 5122.111 of the Revised Code, the lack of compliance resulted in one or more acts of serious violent behavior toward self or others or threats of, or attempts at, serious physical harm to self or others, provided that the forty-eight-month period shall be extended by the length of any hospitalization or incarceration of the person that occurred within the forty-eight-month period.      (c) The person, as a result of mental illness, is unlikely to voluntarily participate in necessary treatment.       (d) In view of the person's treatment history and current behavior, the person is in need of treatment in order to prevent a relapse or deterioration that would be likely to result in substantial risk of serious harm to the person or others.    (e) Represents a substantial risk of physical harm to self or others if allowed to remain at liberty pending examination.    Therefore, it is requested that said person be admitted to the above named facility.    STATEMENT OF BELIEF    Must be filled out by one of the following: a psychiatrist, licensed physician, licensed clinical psychologist, health or ,  or .  (Statement shall include the circumstances under which the  individual was taken into custody and the reason for the person's belief that hospitalization is necessary. The statement shall also include a reference to efforts made to secure the individual's property at his residence if he was taken into custody there. Every reasonable and appropriate effort should be made to take this person into custody in the least conspicuous manner possible.)    25yo M with history of autism spectrum disorder and OCD who has had increasing aggressive behavior toward family and other individuals.  Based on risk of threat to others, would benefit from inpatient psychiatric hospitalization and stabilization.    Mark Gloria MD 4/4/2024     _____________________________________________________________   Place of Employment: [facility]    STATEMENT OF OBSERVATION BY PSYCHIATRIST, LICENSED PHYSICIAN, OR LICENSED CLINICAL PSYCHOLOGIST, IF APPLICABLE    Place of Observation (e.g., [facility], general hospital, office, emergency facility)  (If applicable, please complete)    Mark Gloria MD 4/4/2024    _____________________________________________________________

## 2024-04-06 LAB
ATRIAL RATE: 61 BPM
P AXIS: 60 DEGREES
P OFFSET: 196 MS
P ONSET: 145 MS
PR INTERVAL: 152 MS
Q ONSET: 221 MS
QRS COUNT: 10 BEATS
QRS DURATION: 92 MS
QT INTERVAL: 404 MS
QTC CALCULATION(BAZETT): 406 MS
QTC FREDERICIA: 406 MS
R AXIS: -1 DEGREES
T AXIS: 39 DEGREES
T OFFSET: 423 MS
VENTRICULAR RATE: 61 BPM

## 2024-04-08 LAB
ATRIAL RATE: 91 BPM
P AXIS: 60 DEGREES
P OFFSET: 197 MS
P ONSET: 150 MS
PR INTERVAL: 150 MS
Q ONSET: 225 MS
QRS COUNT: 15 BEATS
QRS DURATION: 88 MS
QT INTERVAL: 344 MS
QTC CALCULATION(BAZETT): 423 MS
QTC FREDERICIA: 395 MS
R AXIS: 2 DEGREES
T AXIS: 56 DEGREES
T OFFSET: 397 MS
VENTRICULAR RATE: 91 BPM

## 2024-04-08 PROCEDURE — 93005 ELECTROCARDIOGRAM TRACING: CPT

## 2024-04-24 ENCOUNTER — HOSPITAL ENCOUNTER (OUTPATIENT)
Facility: HOSPITAL | Age: 24
Setting detail: OBSERVATION
Discharge: NURSING, REHAB OR LONG-TERM CARE | End: 2024-05-16
Attending: STUDENT IN AN ORGANIZED HEALTH CARE EDUCATION/TRAINING PROGRAM | Admitting: STUDENT IN AN ORGANIZED HEALTH CARE EDUCATION/TRAINING PROGRAM
Payer: COMMERCIAL

## 2024-04-24 DIAGNOSIS — F84.0 AUTISM (HHS-HCC): Primary | ICD-10-CM

## 2024-04-24 DIAGNOSIS — H66.90 ACUTE OTITIS MEDIA, UNSPECIFIED OTITIS MEDIA TYPE: ICD-10-CM

## 2024-04-24 DIAGNOSIS — R46.89 AGGRESSIVE BEHAVIOR: ICD-10-CM

## 2024-04-24 DIAGNOSIS — F41.0 PANIC ATTACK: ICD-10-CM

## 2024-04-24 LAB
ALBUMIN SERPL BCP-MCNC: 4.6 G/DL (ref 3.4–5)
ALP SERPL-CCNC: 46 U/L (ref 33–120)
ALT SERPL W P-5'-P-CCNC: 14 U/L (ref 10–52)
AMPHETAMINES UR QL SCN: NORMAL
ANION GAP SERPL CALC-SCNC: 12 MMOL/L (ref 10–20)
APAP SERPL-MCNC: <10 UG/ML
APPEARANCE UR: CLEAR
AST SERPL W P-5'-P-CCNC: 15 U/L (ref 9–39)
BARBITURATES UR QL SCN: NORMAL
BASOPHILS # BLD AUTO: 0.02 X10*3/UL (ref 0–0.1)
BASOPHILS NFR BLD AUTO: 0.3 %
BENZODIAZ UR QL SCN: NORMAL
BILIRUB SERPL-MCNC: 0.4 MG/DL (ref 0–1.2)
BILIRUB UR STRIP.AUTO-MCNC: NEGATIVE MG/DL
BUN SERPL-MCNC: 7 MG/DL (ref 6–23)
BZE UR QL SCN: NORMAL
CALCIUM SERPL-MCNC: 9.4 MG/DL (ref 8.6–10.3)
CANNABINOIDS UR QL SCN: NORMAL
CHLORIDE SERPL-SCNC: 104 MMOL/L (ref 98–107)
CO2 SERPL-SCNC: 27 MMOL/L (ref 21–32)
COLOR UR: NORMAL
CREAT SERPL-MCNC: 0.69 MG/DL (ref 0.5–1.3)
EGFRCR SERPLBLD CKD-EPI 2021: >90 ML/MIN/1.73M*2
EOSINOPHIL # BLD AUTO: 0.11 X10*3/UL (ref 0–0.7)
EOSINOPHIL NFR BLD AUTO: 1.8 %
ERYTHROCYTE [DISTWIDTH] IN BLOOD BY AUTOMATED COUNT: 12.1 % (ref 11.5–14.5)
ETHANOL SERPL-MCNC: <10 MG/DL
FENTANYL+NORFENTANYL UR QL SCN: NORMAL
FLUAV RNA RESP QL NAA+PROBE: NOT DETECTED
FLUBV RNA RESP QL NAA+PROBE: NOT DETECTED
GLUCOSE SERPL-MCNC: 93 MG/DL (ref 74–99)
GLUCOSE UR STRIP.AUTO-MCNC: NEGATIVE MG/DL
HCT VFR BLD AUTO: 41.8 % (ref 41–52)
HGB BLD-MCNC: 14.9 G/DL (ref 13.5–17.5)
IMM GRANULOCYTES # BLD AUTO: 0.07 X10*3/UL (ref 0–0.7)
IMM GRANULOCYTES NFR BLD AUTO: 1.2 % (ref 0–0.9)
KETONES UR STRIP.AUTO-MCNC: NEGATIVE MG/DL
LEUKOCYTE ESTERASE UR QL STRIP.AUTO: NEGATIVE
LYMPHOCYTES # BLD AUTO: 1.77 X10*3/UL (ref 1.2–4.8)
LYMPHOCYTES NFR BLD AUTO: 29.3 %
MCH RBC QN AUTO: 30.1 PG (ref 26–34)
MCHC RBC AUTO-ENTMCNC: 35.6 G/DL (ref 32–36)
MCV RBC AUTO: 84 FL (ref 80–100)
METHADONE UR QL SCN: NORMAL
MONOCYTES # BLD AUTO: 0.94 X10*3/UL (ref 0.1–1)
MONOCYTES NFR BLD AUTO: 15.6 %
NEUTROPHILS # BLD AUTO: 3.13 X10*3/UL (ref 1.2–7.7)
NEUTROPHILS NFR BLD AUTO: 51.8 %
NITRITE UR QL STRIP.AUTO: NEGATIVE
NRBC BLD-RTO: 0 /100 WBCS (ref 0–0)
OPIATES UR QL SCN: NORMAL
OXYCODONE+OXYMORPHONE UR QL SCN: NORMAL
PCP UR QL SCN: NORMAL
PH UR STRIP.AUTO: 7 [PH]
PLATELET # BLD AUTO: 180 X10*3/UL (ref 150–450)
POTASSIUM SERPL-SCNC: 3.7 MMOL/L (ref 3.5–5.3)
PROT SERPL-MCNC: 7.1 G/DL (ref 6.4–8.2)
PROT UR STRIP.AUTO-MCNC: NEGATIVE MG/DL
RBC # BLD AUTO: 4.95 X10*6/UL (ref 4.5–5.9)
RBC # UR STRIP.AUTO: NEGATIVE /UL
SALICYLATES SERPL-MCNC: <3 MG/DL
SARS-COV-2 RNA RESP QL NAA+PROBE: NOT DETECTED
SODIUM SERPL-SCNC: 139 MMOL/L (ref 136–145)
SP GR UR STRIP.AUTO: 1.01
UROBILINOGEN UR STRIP.AUTO-MCNC: <2 MG/DL
WBC # BLD AUTO: 6 X10*3/UL (ref 4.4–11.3)

## 2024-04-24 PROCEDURE — 80053 COMPREHEN METABOLIC PANEL: CPT | Performed by: NURSE PRACTITIONER

## 2024-04-24 PROCEDURE — 87636 SARSCOV2 & INF A&B AMP PRB: CPT | Performed by: NURSE PRACTITIONER

## 2024-04-24 PROCEDURE — 81003 URINALYSIS AUTO W/O SCOPE: CPT | Mod: 59 | Performed by: NURSE PRACTITIONER

## 2024-04-24 PROCEDURE — 85025 COMPLETE CBC W/AUTO DIFF WBC: CPT | Performed by: NURSE PRACTITIONER

## 2024-04-24 PROCEDURE — 99285 EMERGENCY DEPT VISIT HI MDM: CPT

## 2024-04-24 PROCEDURE — 80307 DRUG TEST PRSMV CHEM ANLYZR: CPT | Performed by: NURSE PRACTITIONER

## 2024-04-24 PROCEDURE — 2500000001 HC RX 250 WO HCPCS SELF ADMINISTERED DRUGS (ALT 637 FOR MEDICARE OP): Performed by: NURSE PRACTITIONER

## 2024-04-24 PROCEDURE — 80143 DRUG ASSAY ACETAMINOPHEN: CPT | Performed by: NURSE PRACTITIONER

## 2024-04-24 PROCEDURE — 36415 COLL VENOUS BLD VENIPUNCTURE: CPT | Performed by: NURSE PRACTITIONER

## 2024-04-24 RX ORDER — RISPERIDONE 0.25 MG/1
1 TABLET ORAL ONCE
Status: COMPLETED | OUTPATIENT
Start: 2024-04-24 | End: 2024-04-24

## 2024-04-24 RX ORDER — GUANFACINE 1 MG/1
1 TABLET ORAL ONCE
Status: COMPLETED | OUTPATIENT
Start: 2024-04-24 | End: 2024-04-24

## 2024-04-24 RX ORDER — GUANFACINE 1 MG/1
1 TABLET, EXTENDED RELEASE ORAL ONCE
Status: DISCONTINUED | OUTPATIENT
Start: 2024-04-24 | End: 2024-04-24

## 2024-04-24 RX ORDER — DIVALPROEX SODIUM 500 MG/1
500 TABLET, DELAYED RELEASE ORAL ONCE
Status: COMPLETED | OUTPATIENT
Start: 2024-04-24 | End: 2024-04-24

## 2024-04-24 RX ADMIN — GUANFACINE 1 MG: 1 TABLET ORAL at 22:34

## 2024-04-24 RX ADMIN — DIVALPROEX SODIUM 500 MG: 500 TABLET, DELAYED RELEASE ORAL at 22:33

## 2024-04-24 RX ADMIN — RISPERIDONE 1 MG: 0.25 TABLET, FILM COATED ORAL at 22:34

## 2024-04-24 SDOH — HEALTH STABILITY: MENTAL HEALTH: BEHAVIORS/MOOD: CALM;COOPERATIVE;RESTLESS

## 2024-04-24 SDOH — HEALTH STABILITY: MENTAL HEALTH: BEHAVIORS/MOOD: CALM;COOPERATIVE

## 2024-04-24 SDOH — HEALTH STABILITY: MENTAL HEALTH: BEHAVIORS/MOOD: ANXIOUS;RESTLESS

## 2024-04-24 ASSESSMENT — PAIN - FUNCTIONAL ASSESSMENT: PAIN_FUNCTIONAL_ASSESSMENT: 0-10

## 2024-04-24 ASSESSMENT — PAIN SCALES - GENERAL: PAINLEVEL_OUTOF10: 0 - NO PAIN

## 2024-04-25 PROBLEM — R46.89 AGGRESSIVE BEHAVIOR: Status: ACTIVE | Noted: 2024-04-25

## 2024-04-25 LAB
ALBUMIN SERPL BCP-MCNC: 4.2 G/DL (ref 3.4–5)
ANION GAP SERPL CALC-SCNC: 10 MMOL/L (ref 10–20)
BUN SERPL-MCNC: 7 MG/DL (ref 6–23)
CALCIUM SERPL-MCNC: 9.1 MG/DL (ref 8.6–10.3)
CHLORIDE SERPL-SCNC: 104 MMOL/L (ref 98–107)
CO2 SERPL-SCNC: 28 MMOL/L (ref 21–32)
CREAT SERPL-MCNC: 0.69 MG/DL (ref 0.5–1.3)
EGFRCR SERPLBLD CKD-EPI 2021: >90 ML/MIN/1.73M*2
ERYTHROCYTE [DISTWIDTH] IN BLOOD BY AUTOMATED COUNT: 12.1 % (ref 11.5–14.5)
GLUCOSE SERPL-MCNC: 104 MG/DL (ref 74–99)
HCT VFR BLD AUTO: 41.4 % (ref 41–52)
HGB BLD-MCNC: 14.8 G/DL (ref 13.5–17.5)
HOLD SPECIMEN: NORMAL
HOLD SPECIMEN: NORMAL
MCH RBC QN AUTO: 30.4 PG (ref 26–34)
MCHC RBC AUTO-ENTMCNC: 35.7 G/DL (ref 32–36)
MCV RBC AUTO: 85 FL (ref 80–100)
NRBC BLD-RTO: 0 /100 WBCS (ref 0–0)
PHOSPHATE SERPL-MCNC: 3.5 MG/DL (ref 2.5–4.9)
PLATELET # BLD AUTO: 165 X10*3/UL (ref 150–450)
POTASSIUM SERPL-SCNC: 3.8 MMOL/L (ref 3.5–5.3)
RBC # BLD AUTO: 4.87 X10*6/UL (ref 4.5–5.9)
SODIUM SERPL-SCNC: 138 MMOL/L (ref 136–145)
WBC # BLD AUTO: 5.1 X10*3/UL (ref 4.4–11.3)

## 2024-04-25 PROCEDURE — 2500000006 HC RX 250 W HCPCS SELF ADMINISTERED DRUGS (ALT 637 FOR ALL PAYERS)

## 2024-04-25 PROCEDURE — 80069 RENAL FUNCTION PANEL: CPT

## 2024-04-25 PROCEDURE — 2500000001 HC RX 250 WO HCPCS SELF ADMINISTERED DRUGS (ALT 637 FOR MEDICARE OP)

## 2024-04-25 PROCEDURE — 99222 1ST HOSP IP/OBS MODERATE 55: CPT

## 2024-04-25 PROCEDURE — G0378 HOSPITAL OBSERVATION PER HR: HCPCS

## 2024-04-25 PROCEDURE — 85027 COMPLETE CBC AUTOMATED: CPT

## 2024-04-25 PROCEDURE — 36415 COLL VENOUS BLD VENIPUNCTURE: CPT

## 2024-04-25 RX ORDER — DIVALPROEX SODIUM 250 MG/1
500 TABLET, FILM COATED, EXTENDED RELEASE ORAL DAILY
Status: DISCONTINUED | OUTPATIENT
Start: 2024-04-25 | End: 2024-05-16 | Stop reason: HOSPADM

## 2024-04-25 RX ORDER — RISPERIDONE 1 MG/1
1 TABLET ORAL 2 TIMES DAILY
COMMUNITY

## 2024-04-25 RX ORDER — ACETAMINOPHEN 160 MG/5ML
650 SOLUTION ORAL EVERY 4 HOURS PRN
Status: DISCONTINUED | OUTPATIENT
Start: 2024-04-25 | End: 2024-05-16 | Stop reason: HOSPADM

## 2024-04-25 RX ORDER — SERTRALINE HYDROCHLORIDE 25 MG/1
25 TABLET, FILM COATED ORAL
Status: DISCONTINUED | OUTPATIENT
Start: 2024-04-25 | End: 2024-05-16 | Stop reason: HOSPADM

## 2024-04-25 RX ORDER — DIPHENHYDRAMINE HCL 25 MG
50 CAPSULE ORAL DAILY PRN
COMMUNITY
Start: 2024-04-03 | End: 2024-04-25 | Stop reason: ENTERED-IN-ERROR

## 2024-04-25 RX ORDER — HYDROXYZINE HYDROCHLORIDE 25 MG/1
25 TABLET, FILM COATED ORAL 2 TIMES DAILY
Status: DISCONTINUED | OUTPATIENT
Start: 2024-04-25 | End: 2024-05-16 | Stop reason: HOSPADM

## 2024-04-25 RX ORDER — GUANFACINE 1 MG/1
1 TABLET ORAL 2 TIMES DAILY
COMMUNITY

## 2024-04-25 RX ORDER — GUANFACINE 2 MG/1
2 TABLET, EXTENDED RELEASE ORAL DAILY
Status: DISCONTINUED | OUTPATIENT
Start: 2024-04-25 | End: 2024-04-27

## 2024-04-25 RX ORDER — SERTRALINE HYDROCHLORIDE 50 MG/1
25 TABLET, FILM COATED ORAL
COMMUNITY
Start: 2024-04-03 | End: 2024-04-25 | Stop reason: ENTERED-IN-ERROR

## 2024-04-25 RX ORDER — FLUOXETINE 10 MG/1
10 CAPSULE ORAL DAILY
COMMUNITY
End: 2024-05-16 | Stop reason: HOSPADM

## 2024-04-25 RX ORDER — RISPERIDONE 0.5 MG/1
0.5 TABLET ORAL 2 TIMES DAILY
COMMUNITY
Start: 2024-02-25 | End: 2024-04-25 | Stop reason: ENTERED-IN-ERROR

## 2024-04-25 RX ORDER — DIPHENHYDRAMINE HCL 25 MG
50 CAPSULE ORAL DAILY PRN
Status: DISCONTINUED | OUTPATIENT
Start: 2024-04-25 | End: 2024-05-16 | Stop reason: HOSPADM

## 2024-04-25 RX ORDER — ACETAMINOPHEN 650 MG/1
650 SUPPOSITORY RECTAL EVERY 4 HOURS PRN
Status: DISCONTINUED | OUTPATIENT
Start: 2024-04-25 | End: 2024-05-16 | Stop reason: HOSPADM

## 2024-04-25 RX ORDER — RISPERIDONE 0.25 MG/1
0.5 TABLET ORAL 2 TIMES DAILY
Status: DISCONTINUED | OUTPATIENT
Start: 2024-04-25 | End: 2024-05-16 | Stop reason: HOSPADM

## 2024-04-25 RX ORDER — DIVALPROEX SODIUM 500 MG/1
500 TABLET, FILM COATED, EXTENDED RELEASE ORAL 2 TIMES DAILY
COMMUNITY

## 2024-04-25 RX ORDER — TALC
3 POWDER (GRAM) TOPICAL NIGHTLY PRN
COMMUNITY

## 2024-04-25 RX ORDER — ACETAMINOPHEN 325 MG/1
650 TABLET ORAL EVERY 4 HOURS PRN
Status: DISCONTINUED | OUTPATIENT
Start: 2024-04-25 | End: 2024-05-16 | Stop reason: HOSPADM

## 2024-04-25 RX ADMIN — RISPERIDONE 0.5 MG: 0.25 TABLET, FILM COATED ORAL at 09:52

## 2024-04-25 RX ADMIN — HYDROXYZINE HYDROCHLORIDE 25 MG: 25 TABLET ORAL at 09:53

## 2024-04-25 RX ADMIN — SERTRALINE HYDROCHLORIDE 25 MG: 50 TABLET ORAL at 09:53

## 2024-04-25 RX ADMIN — ACETAMINOPHEN 650 MG: 325 TABLET ORAL at 19:53

## 2024-04-25 RX ADMIN — DIPHENHYDRAMINE HYDROCHLORIDE 50 MG: 25 CAPSULE ORAL at 09:53

## 2024-04-25 RX ADMIN — HYDROXYZINE HYDROCHLORIDE 25 MG: 25 TABLET ORAL at 21:34

## 2024-04-25 RX ADMIN — RISPERIDONE 0.5 MG: 0.25 TABLET, FILM COATED ORAL at 21:34

## 2024-04-25 SDOH — HEALTH STABILITY: MENTAL HEALTH: BEHAVIORS/MOOD: CALM;COOPERATIVE

## 2024-04-25 SDOH — HEALTH STABILITY: MENTAL HEALTH: HAVE YOU WISHED YOU WERE DEAD OR WISHED YOU COULD GO TO SLEEP AND NOT WAKE UP?: NO

## 2024-04-25 SDOH — HEALTH STABILITY: MENTAL HEALTH: SUICIDE ASSESSMENT: ADULT (C-SSRS)

## 2024-04-25 SDOH — HEALTH STABILITY: MENTAL HEALTH: WISH TO BE DEAD (PAST 1 MONTH): NO

## 2024-04-25 SDOH — HEALTH STABILITY: MENTAL HEALTH: HAVE YOU ACTUALLY HAD ANY THOUGHTS OF KILLING YOURSELF?: NO

## 2024-04-25 SDOH — HEALTH STABILITY: MENTAL HEALTH: BEHAVIORS/MOOD: CALM;COOPERATIVE;RESTLESS

## 2024-04-25 SDOH — HEALTH STABILITY: MENTAL HEALTH: IN THE PAST WEEK, HAVE YOU BEEN HAVING THOUGHTS ABOUT KILLING YOURSELF?: NO

## 2024-04-25 SDOH — HEALTH STABILITY: MENTAL HEALTH: IN THE PAST FEW WEEKS, HAVE YOU WISHED YOU WERE DEAD?: NO

## 2024-04-25 SDOH — HEALTH STABILITY: MENTAL HEALTH: ANXIETY SYMPTOMS: NO PROBLEMS REPORTED OR OBSERVED.

## 2024-04-25 SDOH — HEALTH STABILITY: MENTAL HEALTH: ARE YOU HAVING THOUGHTS OF KILLING YOURSELF RIGHT NOW?: NO

## 2024-04-25 SDOH — HEALTH STABILITY: MENTAL HEALTH: DEPRESSION SYMPTOMS: NO PROBLEMS REPORTED OR OBSERVED.

## 2024-04-25 SDOH — SOCIAL STABILITY: SOCIAL NETWORK: EMOTIONAL SUPPORT GIVEN: PATIENT AND FAMILY COUNSELING

## 2024-04-25 SDOH — HEALTH STABILITY: MENTAL HEALTH: BEHAVIORS/MOOD: APPROPRIATE FOR AGE;CALM;CONGRUENT;FLAT AFFECT;MANIPULATIVE;PACING;WANDERING;OTHER (COMMENT)

## 2024-04-25 SDOH — HEALTH STABILITY: MENTAL HEALTH: SLEEP PATTERN: NAPS DURING THE DAY

## 2024-04-25 SDOH — HEALTH STABILITY: MENTAL HEALTH

## 2024-04-25 SDOH — HEALTH STABILITY: MENTAL HEALTH: BEHAVIORS/MOOD: SLEEPING

## 2024-04-25 SDOH — HEALTH STABILITY: MENTAL HEALTH: BEHAVIORS/MOOD: APPROPRIATE FOR SITUATION;COOPERATIVE;HOSTILE;MANIPULATIVE;PLEASANT

## 2024-04-25 SDOH — HEALTH STABILITY: MENTAL HEALTH: SUICIDAL BEHAVIOR (LIFETIME): NO

## 2024-04-25 SDOH — HEALTH STABILITY: MENTAL HEALTH: NON-SPECIFIC ACTIVE SUICIDAL THOUGHTS (PAST 1 MONTH): NO

## 2024-04-25 SDOH — HEALTH STABILITY: MENTAL HEALTH: IN THE PAST FEW WEEKS, HAVE YOU FELT THAT YOU OR YOUR FAMILY WOULD BE BETTER OFF IF YOU WERE DEAD?: NO

## 2024-04-25 SDOH — HEALTH STABILITY: MENTAL HEALTH: BEHAVIORS/MOOD: CALM;COOPERATIVE;DEFIANT;FLAT AFFECT;IMPULSIVE;MANIPULATIVE;WANDERING

## 2024-04-25 SDOH — HEALTH STABILITY: MENTAL HEALTH: HAVE YOU EVER TRIED TO KILL YOURSELF?: NO

## 2024-04-25 SDOH — ECONOMIC STABILITY: HOUSING INSECURITY: FEELS SAFE LIVING IN HOME: YES

## 2024-04-25 SDOH — HEALTH STABILITY: MENTAL HEALTH: BEHAVIORS/MOOD: APPROPRIATE FOR SITUATION;CALM;COOPERATIVE

## 2024-04-25 ASSESSMENT — LIFESTYLE VARIABLES
SUBSTANCE_ABUSE_PAST_12_MONTHS: NO
PRESCIPTION_ABUSE_PAST_12_MONTHS: NO

## 2024-04-25 ASSESSMENT — PAIN - FUNCTIONAL ASSESSMENT: PAIN_FUNCTIONAL_ASSESSMENT: 0-10

## 2024-04-25 ASSESSMENT — PAIN SCALES - GENERAL
PAINLEVEL_OUTOF10: 0 - NO PAIN

## 2024-04-25 NOTE — H&P
History Of Present Illness  Juliocesar Walker is a 24 y.o. male with a past medical history of autism spectrum disorder and OCD who presents to the hospital from home due to combative aggressive behavior towards his family.  He was recently at [facility] where he had some medication adjustments and was discharged home yesterday.  He was combative towards his siblings and his mother and exhibited aggressive behavior that involved harm.  On questioning of the patient, he states that he wants to go home and regrets doing what he did.  He has previous admissions for similar reasons.  It is reported that his mother wants him to be sent to a group home now due to him being dangerous at their house.  He started acting in an aggressive way back in February 2024.  At that time he was sent to [facility].  He then returned home and exhibited violent behavior always wanting to his brother again.  It is reported that he also obsesses over small details and gets agitated very easily.  He denies any symptoms including abdominal pain, nausea, vomiting, chest pain, urinary symptoms.  On arrival to the ED, he was hemodynamically stable.  There are no significant labs.  Drug screen urinalysis and serum toxicology is negative.  He is being admitted to medicine team for further evaluation for group home placement.    CODE STATUS: Full code     Past Medical History  Past Medical History:   Diagnosis Date    Autism spectrum disorder ([facility])     Influenza due to other identified influenza virus with other respiratory manifestations     Influenza A (H1N1) 12/08/2014    OCD (obsessive compulsive disorder)        Surgical History  History reviewed. No pertinent surgical history.     Social History  He reports that he has never smoked. He has never used smokeless tobacco. He reports current alcohol use. He reports that he does not use drugs.    Family History  Family History   Problem Relation Name Age of  Onset    Autism spectrum disorder Brother          Allergies  Patient has no known allergies.    Review of Systems   A 12 point review of systems was performed and otherwise negative except as stated in the HPI.   Physical Exam  General:  Pleasant and cooperative.  Currently calm and responsive.  HEENT:  Normocephalic, atraumatic, mucus membranes moist.   Neck:  Trachea midline.  No JVD.    Chest:  Clear to auscultation bilaterally. No wheezes, rales, or rhonchi.  CV:  Regular rate and rhythm.  Positive S1/S2.   Abdomen: Bowel sounds present in all four quadrants, abdomen is soft, non-tender, non-distended.  Extremities:  No lower extremity edema or cyanosis.   Neurological:  AAOx3. No focal deficits.  Skin:  Warm and dry.   Last Recorded Vitals  Blood pressure 135/75, pulse 68, temperature 37.1 °C (98.8 °F), resp. rate 16, weight 68 kg (150 lb), SpO2 100%.    Relevant Results  All labs and images reviewed by myself.     Assessment/Plan   Juliocesar Walker is a 24 y.o. male with a past medical history of autism spectrum disorder and OCD who presents to the hospital from home due to combative aggressive behavior towards his family.  He has a significant history of previous similar admissions.  There are no significant symptoms present.  All labs insignificant.  He has been admitted to medicine team for further evaluation for group home placement.    # Aggressive behavior  # Autism spectrum disorder  # OCD  - This is a young patient presenting with combative aggressive behavior towards his mother and brother at home.  He has been sent to the hospital for further evaluation to send him to a group home as requested by his mother.  Patient states that he would like to go back home and feels some regret for what he did however his mother believes that it is not safe for him to be at home due to his unpredictable aggressive behavior.    Plan:  - We will continue his home meds including Depakote, guanfacine, hydroxyzine,  risperidone, and sertraline.  Continue diphenhydramine 50 mg once daily as needed for sleep.  - EPAT evaluated patient and believe that there is no definitive reason for having psychiatric admission at this time.  - We will consult social work for further assistance on group home placement.    -DVT PPx: None    Ignacio Marley MD  PGY1 internal medicine

## 2024-04-25 NOTE — ED TRIAGE NOTES
Patient arrives by EMS after younger brother called 911 from a [facility] reporting concern for patient with abnormal behavior and requesting psychiatric evaluation. Per report, recently discharged from admission [facility] and had medication adjustments. On arrival, patient is pleasant, answering questions. Somewhat restless but directable.

## 2024-04-25 NOTE — PROGRESS NOTES
EPAT - Social Work Psychiatric Assessment    Arrival Details  Mode of Arrival: Ambulance  Admission Source: Home  Admission Type: Involuntary  EPAT Assessment Start Date: 04/25/24  EPAT Assessment Start Time: 0120  Name of : MONICA Zuh LSW    History of Present Illness  Admission Reason: Psych Eval  Patient is a 24 year old [place] male, with a history of Autism and Provisional OCD, presenting to the ED for a psychiatric evaluation. ED notes indicate that pt's younger brother called 911 from a neighbor's house “with concern for pt's abnormal/ aggressive bx,” and that pt's mom reportedly expressed concern that pt's recent medication adjustments may be the cause of his behavior, and feeling unsafe with pt in the home. Mom also reported to ED staff that the pt “appears loopy and out of it, has a psychotic look, has been obsessing over small details and becomes agitated, and has not been sleeping.” Pt was reportedly somewhat agitated while out in the hallway, but calmed down once moved into a private room.    Today's ED notes and further review of patient's chart reflects an ED visit on 4/3/24 with reports of aggression at home and threats to harm a neighborhood child. At that time, pt was reportedly hyperactive in the ED, and observed singing/ dancing in his room and needed constant redirection back into his room. The psych assessment completed that day indicated that the pt presented appropriately, reported that he had “been giving his mom a hard time,” and that he denied SI, HI, AVH. That assessment also stated the following: the patient was noted to be concrete, but calm; he denied all psychiatric symptoms, including thoughts of harm to others. Collateral from the patient's mother reveals a chronic history of repetitive behaviors with episodic agitation and aggression (hitting mother, siblings) and verbal threats to harm others (eg, neighbor children). The initial history suggests that the  patient's symptoms have not necessarily worsened acutely; possible precipitants could include recent illness and fairly recent change in routine (graduation from day program, not yet involved in new day program)…will proceed with inpatient psychiatric hospitalization given the elevated risk of harm to others and desire for admission by the legal guardian. (Mom) Feels threatened by [place] and is not comfortable with him returning home. Hopes he can be placed in an emergency group home from inpatient psychiatry. Patient was hospitalized at [facility] following that assessment, and was just discharged home on 4/23/24. Pt was also in the ED this past February due to reports of increased agitation at home, and hospitalized at [facility]. Pt does not have any other hx of IP psych admissions.     SW Readmission Information   Readmission within 30 Days: Yes  Previous ED Visit Date and Reason : [place] [facility] 4/3/24 for Agitation  Previous Discharge Date and Location: [facility] 4/23/24  Factors Contributing to  Readmission Inpatient/ED (Team Perspective): Cognitively Limited (Mom wants out of home placement)    Psychiatric Symptoms  Anxiety Symptoms: No problems reported or observed.  Depression Symptoms: No problems reported or observed.  Bridget Symptoms: No problems reported or observed.    Psychosis Symptoms  Hallucination Type: No problems reported or observed.  Delusion Type: No problems reported or observed.    Additional Symptoms - Adult  Generalized Anxiety Disorder: No problems reported or observed.  Obsessive Compulsive Disorder: No problems reported or observed.  Panic Attack: No problems reported or observed.  Post Traumatic Stress Disorder: No problems reported or observed.  Delirium: No problems reported or observed.    Past Psychiatric History:   Psychiatric Diagnosis: Autism, Provisional OCD    Outpatient Mental Health Center: [facility]    Previous Psychiatric Inpatient Hospitalizations: [facility]  4/3-4/23/24; [facility] 2/2024    History of Self-Harming Behaviors/ Suicide Attempts: Per chart- hx of head-banging and biting his hand as SIB. No hx of SA, per chart and pt.    Past Psychiatric Meds/Treatments: Not assessed    Past Violence/Victimization History: Chronic hx of aggression at home, with recent increase.    Current Mental Health Contacts   Name/Phone Number: RAKEL SHIELDS, name unk   Last Appointment Date: mom spoke to them a few days ago  Provider Name/Phone Number: Heather Solis NP  Provider Last Appointment Date: 4/24/24    Support System: Immediate family (mom, siblings)    Living Arrangement:  (was home with mom and siblings, mom reports no longer being able to care for pt and wanting out of home placement)    Home Safety  Feels Safe Living in Home: Yes    Income Information  Employment Status for: Patient  Employment Status: Disabled  Income Source:  (not assessed)    [employer] Service/Education History  Current or Previous  Service: None  Education Level: High school  History of Learning Problems: Yes  History of School Behavior Problems:  (not assessed)    Social/Cultural History  Social History: 25yo [place] male, never , no kids, raised by mom with twin brother and 3 other siblings.  Important Activities: Hobbies, Family    Legal  Assistance with Managing/Advocating Healthcare Needs: Legal Guardian (Mom)  Criminal Activity/ Legal Involvement Pertinent to Current Situation/ Hospitalization: None, per chart.    Drug Screening  Have you used any substances (canabis, cocaine, heroin, hallucinogens, inhalants, etc.) in the past 12 months?: No  Have you used any prescription drugs other than prescribed in the past 12 months?: No  Is a toxicology screen needed?: No      Psychosocial  Psychosocial (WDL): Within Defined Limits  Behaviors/Mood: Calm, Cooperative, Pleasant  Affect: Appropriate to circumstances    Orientation  Orientation Level: Oriented  X4    General Appearance  Motor Activity: Restlessness  Speech Pattern: Stuttering, Repetitive, Slow  General Attitude: Attentive, Cooperative, Pleasant  Appearance/Hygiene: Unremarkable    Thought Process  Coherency: [place] thinking  Content: Blaming self  Delusions:  (None)  Perception: Not altered  Hallucination: None  Judgment/Insight: Limited  Confusion: Moderate  Cognition: Cognitive delay, Impulsive, Appropriate attention/concentration, Follows commands      Risk Factors  Self Harm/Suicidal Ideation Plan: Denies, none reported or known otherwise.  Previous Self Harm/Suicidal Plans: Per chart- hx of head-banging and biting his hand as SIB. No hx of SA, per chart and pt.  Risk Factors: Lower IQ, Male, Past history of violence    Violence Risk Assessment  Assessment of Violence: In past 6-12 months  Thoughts of Harm to Others: No    Ability to Assess Risk Screen  Risk Screen - Ability to Assess: Able to be screened  Ask Suicide-Screening Questions  1. In the past few weeks, have you wished you were dead?: No  2. In the past few weeks, have you felt that you or your family would be better off if you were dead?: No  3. In the past week, have you been having thoughts about killing yourself?: No  4. Have you ever tried to kill yourself?: No  5. Are you having thoughts of killing yourself right now?: No  Calculated Risk Score: No intervention is necessary  [place] Suicide Severity Rating Scale (Screener/Recent Self-Report)  1. Wish to be Dead (Past 1 Month): No  2. Non-Specific Active Suicidal Thoughts (Past 1 Month): No  6. Suicidal Behavior (Lifetime): No  Calculated C-SSRS Risk Score (Lifetime/Recent): No Risk Indicated  Step 1: Risk Factors  Current & Past Psychiatric Dx: Conduct problems (antisocial behavior, aggression, impulsivity)  Presenting Symptoms: Impulsivity  Family History:  (Twin brother has Autism)  Precipitants/Stressors: Perceived burden on others  Change in Treatment: Recent inpatient  discharge, Change in provider or treament (i.e., medications, psychotherapy, milieu)  Access to Lethal Methods : No  Step 2: Protective Factors   Protective Factors Internal: Identifies reasons for living, Fear of death or the actual act of killing self  Protective Factors External: Supportive social network or family or friends  Step 5: Documentation  Risk Level: Low suicide risk    Assessment and Plan of Care:  This writer evaluated the patient via telemedicine, alone in his hospital room. Pt was calm, cooperative, A&Ox3, and very nice and pleasant, as he asked this writer about her day and a few questions about her job. When asked why he is in the hospital, pt stated “I was talking about the same thing, words repeated, same as last time.” This writer asked if pt was talking about hurting other people as it was reported last time, and pt denied. This writer attempted to gain clarity on what pt was referring to, and he just stated “repeating is bad.” Pt denied making threats, or attempting to hurt anyone in his home, or having thoughts to do so. Pt reported not knowing why his mom and brother were afraid of him. Pt denied SI and AVH. Pt reported taking his medication when given to him in the 24 hours that he was at home prior to this ED visit. It appears pt took the medication provided to him in the ED without issue as well. Pt reported feeling safe at home and wanting to return.    This writer spoke separately with pt's mom via telephone. She reported “feeling as if anything she does triggers pt, he will repeatedly say that he's screwed up and needs to talk to her, and will repeat things for hours on end, he gets angry about anything mom does, like shaking her head and moving papers around, she asked if he was hungry and he got upset and said he feels screwed up, he gets angry if she doesn't sit and talk to him about it for a while, he was rubbing the covers repetitively yesterday which is not normal, they had a  virtual appointment with his psychiatrist yesterday and instead of paying attention to her, pt was looking at his 9yo brother with like this dead stare and not saying anything, he seemed zoned out when the psychiatrist tried to talk to him, he did talk about how his stomach hurts and he can't eat, mom asked the psychiatrist if this may be due to all of the new meds he is now taking, and she said they will see how things go over the next few days and make changes if needed, but it is hard to pinpoint the cause of pt's stomach pain because he has a hard time communicating.” Mom went on to state that she does not feel safe around the pt due to her seeming to be a constant trigger for him, causing her to fear that he will get violent when he gets that upset again, that him needing so much attention from her prevents her from eating, sleeping, showering, and tending to her other children, and that she is unable to manage his behavior as it is getting worse as he gets older. Mom referenced her request during the previous ED psych assessment that pt receive out of home placement following the IP psych admission, and reported that she spoke with pt's [facility] SA's supervisor while pt was at [facility] about this as well, and was told that pt should be able to get placement on the behavioral health unit of a nursing home due to his behavior. This writer discussed with mom the possibility of a social admission for placement, and explained that mom would need to be available to speak with floor social workers, and likely connect them with pt's [facility] workers, to assist with this placement. Mom was in agreement.     This was all discussed with the ED provider, along with the fact that pt is not presenting with any acute psychiatric symptoms that would warrant another IP psych admission at this time. ED provider was in agreement with social admission for placement.      Diagnosis: Autism    Outcome/Disposition  Patient's  Perception of Outcome Achieved: Mom/ LG in agreement  Assessment, Recommendations and Risk Level Reviewed with: Jaja Keith MD  Contact Name: Katerin Walker  Contact Number(s): [phone]; [phone]; [phone]  Contact Relationship: Mom/ Legal Guardian  EPAT Assessment Completed Date: 04/25/24  EPAT Assessment Completed Time: 0224  Patient Disposition:  (Social Admission for Placement)    Social Work Note

## 2024-04-25 NOTE — PROGRESS NOTES
"AMY Walker is a 24 y.o. male with autism on day 0 of admission for aggressive behavior. He is a poor historian. Per chart review, he was brought in by EMS for aggressive behavior at home towards mother and brother. He states he spoke with his mother yesterday, and she told him he \"has to be good\". He repeatedly states that he wants to go home. Per Psych note, he did have abdominal pain yesterday. However, he does not recall this episode and denies any abdominal pain today. He denies any other pain. Denies SI/HI.       PHYSICAL EXAM  General:  Cooperative. No apparent distress. Non-combative during history-taking and exam.  HEENT: Normocephalic. Atraumatic.  Chest:  Clear to auscultation. Unlabored breathing.  CV:  RRR.  Abdomen: Soft, non-tender, non-distended. BS present.   Extremities:  Ambulating independently.   Neuro: Answering questions appropriately.  Skin:  Warm and dry.      Last Recorded Vitals  Blood pressure 133/73, pulse 65, temperature 37.1 °C (98.8 °F), resp. rate 16, weight 68 kg (150 lb), SpO2 99%.    Intake/Output last 3 Shifts:  No intake/output data recorded.    Last CBC & BMP  Lab Results   Component Value Date    GLUCOSE 104 (H) 04/25/2024    CALCIUM 9.1 04/25/2024     04/25/2024    K 3.8 04/25/2024    CO2 28 04/25/2024     04/25/2024    BUN 7 04/25/2024    CREATININE 0.69 04/25/2024     Lab Results   Component Value Date    WBC 5.1 04/25/2024    HGB 14.8 04/25/2024    HCT 41.4 04/25/2024    MCV 85 04/25/2024     04/25/2024           ASSESSMENT & PLAN  #Aggressive behavior  #Autism spectrum disorder  #OCD  - Patient brought in by EMS for aggressive behavior toward family. EPAT evaluation done, and he does not qualify for Psych admission. Admitted to Medicine for Social Work consultation for group home. Awaiting Social Work recs.  - Continue home meds: Depakote, guanfacine, risperidone, sertraline, hydroxyzine, diphenhydramine.       Code status: Full code  DVT " ppx: None  Diet: Adult regular      This is a preliminary note. Please await the attending attestation for final A/P.    Julia Steward DPM PGY1  Internal Medicine Service
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
## 2024-04-25 NOTE — PROGRESS NOTES
Pharmacy Medication History Review    Juliocesar Walker is a 24 y.o. male admitted for Autism spectrum disorder ([facility]). Pharmacy reviewed the patient's [id number] medications and allergies for accuracy.    The list below reflectives the updated PTA list. Please review each medication in order reconciliation for additional clarification and justification.  Prior to Admission medications    Medication Sig Start Date End Date Taking? Authorizing Provider   divalproex (Depakote ER) 500 mg 24 hr tablet Take 1 tablet (500 mg) by mouth 2 times a day. Do not crush, chew, or split.    Historical Provider, MD   FLUoxetine (PROzac) 10 mg capsule Take 1 capsule (10 mg) by mouth once daily.    Historical Provider, MD   guanFACINE (Tenex) 1 mg tablet Take 1 tablet (1 mg) by mouth 2 times a day.    Historical Provider, MD   melatonin (Melatin) 3 mg tablet Take 1 tablet (3 mg) by mouth as needed at bedtime for sleep.    Historical Provider, MD   risperiDONE (RisperDAL) 1 mg tablet Take 1 tablet (1 mg) by mouth 2 times a day.    Historical Provider, MD        The list below reflectives the updated allergy list. Please review each documented allergy for additional clarification and justification.  Allergies  Reviewed by Ignacio Marley MD on 4/25/2024   No Known Allergies         Below are additional concerns with the patient's PTA list.      Alina Seymour

## 2024-04-26 PROCEDURE — 2500000001 HC RX 250 WO HCPCS SELF ADMINISTERED DRUGS (ALT 637 FOR MEDICARE OP)

## 2024-04-26 PROCEDURE — 99232 SBSQ HOSP IP/OBS MODERATE 35: CPT

## 2024-04-26 PROCEDURE — 2500000006 HC RX 250 W HCPCS SELF ADMINISTERED DRUGS (ALT 637 FOR ALL PAYERS)

## 2024-04-26 PROCEDURE — G0378 HOSPITAL OBSERVATION PER HR: HCPCS

## 2024-04-26 RX ORDER — GUANFACINE 1 MG/1
1 TABLET ORAL NIGHTLY
Status: DISCONTINUED | OUTPATIENT
Start: 2024-04-26 | End: 2024-04-27

## 2024-04-26 RX ADMIN — HYDROXYZINE HYDROCHLORIDE 25 MG: 25 TABLET ORAL at 11:00

## 2024-04-26 RX ADMIN — RISPERIDONE 0.5 MG: 0.25 TABLET, FILM COATED ORAL at 11:00

## 2024-04-26 RX ADMIN — RISPERIDONE 0.5 MG: 0.25 TABLET, FILM COATED ORAL at 20:23

## 2024-04-26 RX ADMIN — HYDROXYZINE HYDROCHLORIDE 25 MG: 25 TABLET ORAL at 20:23

## 2024-04-26 RX ADMIN — SERTRALINE HYDROCHLORIDE 25 MG: 50 TABLET ORAL at 11:00

## 2024-04-26 RX ADMIN — GUANFACINE 1 MG: 1 TABLET ORAL at 11:00

## 2024-04-26 RX ADMIN — DIVALPROEX SODIUM 500 MG: 250 TABLET, FILM COATED, EXTENDED RELEASE ORAL at 12:32

## 2024-04-26 RX ADMIN — GUANFACINE 1 MG: 1 TABLET ORAL at 20:23

## 2024-04-26 SDOH — SOCIAL STABILITY: SOCIAL INSECURITY: HAVE YOU HAD THOUGHTS OF HARMING ANYONE ELSE?: UNABLE TO ASSESS

## 2024-04-26 SDOH — SOCIAL STABILITY: SOCIAL INSECURITY: WERE YOU ABLE TO COMPLETE ALL THE BEHAVIORAL HEALTH SCREENINGS?: YES

## 2024-04-26 SDOH — SOCIAL STABILITY: SOCIAL INSECURITY: ARE THERE ANY APPARENT SIGNS OF INJURIES/BEHAVIORS THAT COULD BE RELATED TO ABUSE/NEGLECT?: UNABLE TO ASSESS

## 2024-04-26 SDOH — SOCIAL STABILITY: SOCIAL INSECURITY: ABUSE: ADULT

## 2024-04-26 SDOH — SOCIAL STABILITY: SOCIAL INSECURITY: DO YOU FEEL ANYONE HAS EXPLOITED OR TAKEN ADVANTAGE OF YOU FINANCIALLY OR OF YOUR PERSONAL PROPERTY?: UNABLE TO ASSESS

## 2024-04-26 SDOH — SOCIAL STABILITY: SOCIAL INSECURITY: HAVE YOU HAD ANY THOUGHTS OF HARMING ANYONE ELSE?: UNABLE TO ASSESS

## 2024-04-26 SDOH — SOCIAL STABILITY: SOCIAL INSECURITY: HAS ANYONE EVER THREATENED TO HURT YOUR FAMILY OR YOUR PETS?: UNABLE TO ASSESS

## 2024-04-26 SDOH — SOCIAL STABILITY: SOCIAL INSECURITY: DOES ANYONE TRY TO KEEP YOU FROM HAVING/CONTACTING OTHER FRIENDS OR DOING THINGS OUTSIDE YOUR HOME?: UNABLE TO ASSESS

## 2024-04-26 SDOH — SOCIAL STABILITY: SOCIAL INSECURITY: ARE YOU OR HAVE YOU BEEN THREATENED OR ABUSED PHYSICALLY, EMOTIONALLY, OR SEXUALLY BY ANYONE?: UNABLE TO ASSESS

## 2024-04-26 ASSESSMENT — ACTIVITIES OF DAILY LIVING (ADL)
TOILETING: INDEPENDENT
LACK_OF_TRANSPORTATION: PATIENT UNABLE TO ANSWER
HEARING - RIGHT EAR: FUNCTIONAL
HEARING - LEFT EAR: FUNCTIONAL
JUDGMENT_ADEQUATE_SAFELY_COMPLETE_DAILY_ACTIVITIES: NO
PATIENT'S MEMORY ADEQUATE TO SAFELY COMPLETE DAILY ACTIVITIES?: YES
BATHING: INDEPENDENT
WALKS IN HOME: INDEPENDENT
GROOMING: INDEPENDENT
ADEQUATE_TO_COMPLETE_ADL: YES
FEEDING YOURSELF: INDEPENDENT
DRESSING YOURSELF: INDEPENDENT

## 2024-04-26 ASSESSMENT — COGNITIVE AND FUNCTIONAL STATUS - GENERAL
MOBILITY SCORE: 24
DAILY ACTIVITIY SCORE: 24
PATIENT BASELINE BEDBOUND: NO

## 2024-04-26 ASSESSMENT — PATIENT HEALTH QUESTIONNAIRE - PHQ9
2. FEELING DOWN, DEPRESSED OR HOPELESS: NOT AT ALL
SUM OF ALL RESPONSES TO PHQ9 QUESTIONS 1 & 2: 0
1. LITTLE INTEREST OR PLEASURE IN DOING THINGS: NOT AT ALL

## 2024-04-26 ASSESSMENT — LIFESTYLE VARIABLES
HOW MANY STANDARD DRINKS CONTAINING ALCOHOL DO YOU HAVE ON A TYPICAL DAY: PATIENT DOES NOT DRINK
HOW OFTEN DO YOU HAVE 6 OR MORE DRINKS ON ONE OCCASION: NEVER
HOW OFTEN DO YOU HAVE A DRINK CONTAINING ALCOHOL: NEVER
AUDIT-C TOTAL SCORE: 0
AUDIT-C TOTAL SCORE: 0
SKIP TO QUESTIONS 9-10: 1

## 2024-04-26 ASSESSMENT — PAIN SCALES - GENERAL
PAINLEVEL_OUTOF10: 0 - NO PAIN
PAINLEVEL_OUTOF10: 0 - NO PAIN

## 2024-04-26 NOTE — CARE PLAN
The patient's goals for the shift include      The clinical goals for the shift include  safety and calm    Pain  Add All  My pain/discomfort is manageable  Add  Today at 0011 - Progressing by Dian Matthews RN  Add  Safety  Add All  Patient will be injury free during hospitalization  Add  Today at 0011 - Progressing by Dian Matthews RN  Add  I will remain free of falls  Add  Today at 0011 - Progressing by Dian Matthews RN  Add  Daily Care  Add All  Daily care needs are met  Add  Today at 0011 - Progressing by Dian Matthews RN  Add  Psychosocial Needs  Add All  Demonstrates ability to cope with hospitalization/illness  Add  Today at 0011 - Progressing by Dian Matthews RN  Add  Collaborate with me, my family, and caregiver to identify my specific goals  Add  Today at 0011 - Progressing by Dian Matthews RN  Add  Discharge Barriers  Add All  My discharge needs are met  Add  Today at 0011 - Progressing by Dian Matthews RN  Add
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
## 2024-04-26 NOTE — PROGRESS NOTES
SW contacted the patient's mom at the number provided in the chart and a voicemail had to be left.  SW then contacted the Board of  and it was determined patient does have a  Maxine Mcmullen [phone].  SW contacted Ms Mcmullen and left a VM for them as well to discuss services being provided for the patient and placement in a group home.  SW will continue to follow up and provide support as needed.    UPDATE 8299: SW received call back from patient's mom.  She provided the names of the supervisor at the Board of DD Luis Melendrez [phone].  Mom stated she had spoken to him today (4/26) but he had left at 2pm.  SW did call and leave a message. Also provided was the name of psychiatrist Heather Solis [phone].  SW attempted to call twice however number was not working.  Mother stated due to patient's escalating behavior, she does not feel safe with him returning home.  At this time, the process of securing a group home has not been imitated.  Mother talked about patient being placed in respite (SNF with behavioral unit) and SW stated they would initiate referral in [facility].  Mom also stated a waiver was in the process of being approved so patient could go to an adult day program.  SW will continue to follow up.    UPDATE 2931: Referrals made to seven facilities to inquire about respite    LUL STARKS LCSW

## 2024-04-27 PROCEDURE — 2500000001 HC RX 250 WO HCPCS SELF ADMINISTERED DRUGS (ALT 637 FOR MEDICARE OP)

## 2024-04-27 PROCEDURE — G0378 HOSPITAL OBSERVATION PER HR: HCPCS

## 2024-04-27 PROCEDURE — 2500000006 HC RX 250 W HCPCS SELF ADMINISTERED DRUGS (ALT 637 FOR ALL PAYERS)

## 2024-04-27 RX ADMIN — DIVALPROEX SODIUM 500 MG: 250 TABLET, FILM COATED, EXTENDED RELEASE ORAL at 08:14

## 2024-04-27 RX ADMIN — HYDROXYZINE HYDROCHLORIDE 25 MG: 25 TABLET ORAL at 20:09

## 2024-04-27 RX ADMIN — RISPERIDONE 0.5 MG: 0.25 TABLET, FILM COATED ORAL at 20:09

## 2024-04-27 RX ADMIN — RISPERIDONE 0.5 MG: 0.25 TABLET, FILM COATED ORAL at 08:14

## 2024-04-27 RX ADMIN — HYDROXYZINE HYDROCHLORIDE 25 MG: 25 TABLET ORAL at 08:14

## 2024-04-27 RX ADMIN — SERTRALINE HYDROCHLORIDE 25 MG: 50 TABLET ORAL at 08:14

## 2024-04-27 ASSESSMENT — COGNITIVE AND FUNCTIONAL STATUS - GENERAL
MOBILITY SCORE: 24
DAILY ACTIVITIY SCORE: 24

## 2024-04-27 ASSESSMENT — PAIN SCALES - GENERAL
PAINLEVEL_OUTOF10: 0 - NO PAIN
PAINLEVEL_OUTOF10: 0 - NO PAIN

## 2024-04-27 NOTE — CARE PLAN
The patient's goals for the shift include jackie    The clinical goals for the shift include comfort and safety

## 2024-04-27 NOTE — PROGRESS NOTES
24 1516   Discharge Planning   Living Arrangements Parent   Support Systems Parent   Type of Residence Private residence   Number of Stairs to Enter Residence 1   Number of Stairs Within Residence 12   Do you have animals or pets at home? Yes   Type of Animals or Pets 2 Dogs and 1 Parrot   Who is requesting discharge planning? Provider   Home or Post Acute Services Other (Comment)   Type of Post Acute Facility Services Other (Comment)   Patient expects to be discharged to: Group Home? Respite?   Does the patient need discharge transport arranged? Yes     TCC Note: Spoke with pt's Mother, Francisca, regarding gathering information for Discharge Planning. Introduced self and explained role on the Transition of Care Team and informed mother that TCCs and Sws work together on the same team and that I need to gather different information that SW already has. Verified pt name, , demographics, insurance and PCP. Pt was previously living with Mother and able to perform own ADLs. Mother did state that it did take pt some time to get those done but was able to by himself.
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
## 2024-04-28 LAB
ALBUMIN SERPL BCP-MCNC: 4.9 G/DL (ref 3.4–5)
ALP SERPL-CCNC: 56 U/L (ref 33–120)
ALT SERPL W P-5'-P-CCNC: 20 U/L (ref 10–52)
ANION GAP SERPL CALC-SCNC: 12 MMOL/L (ref 10–20)
AST SERPL W P-5'-P-CCNC: 16 U/L (ref 9–39)
BASOPHILS # BLD AUTO: 0.03 X10*3/UL (ref 0–0.1)
BASOPHILS NFR BLD AUTO: 0.5 %
BILIRUB SERPL-MCNC: 0.4 MG/DL (ref 0–1.2)
BUN SERPL-MCNC: 16 MG/DL (ref 6–23)
CALCIUM SERPL-MCNC: 9.6 MG/DL (ref 8.6–10.3)
CHLORIDE SERPL-SCNC: 102 MMOL/L (ref 98–107)
CO2 SERPL-SCNC: 27 MMOL/L (ref 21–32)
CREAT SERPL-MCNC: 0.92 MG/DL (ref 0.5–1.3)
EGFRCR SERPLBLD CKD-EPI 2021: >90 ML/MIN/1.73M*2
EOSINOPHIL # BLD AUTO: 0.2 X10*3/UL (ref 0–0.7)
EOSINOPHIL NFR BLD AUTO: 3.2 %
ERYTHROCYTE [DISTWIDTH] IN BLOOD BY AUTOMATED COUNT: 12 % (ref 11.5–14.5)
GLUCOSE SERPL-MCNC: 101 MG/DL (ref 74–99)
HCT VFR BLD AUTO: 43.5 % (ref 41–52)
HGB BLD-MCNC: 15.4 G/DL (ref 13.5–17.5)
IMM GRANULOCYTES # BLD AUTO: 0.07 X10*3/UL (ref 0–0.7)
IMM GRANULOCYTES NFR BLD AUTO: 1.1 % (ref 0–0.9)
LYMPHOCYTES # BLD AUTO: 1.99 X10*3/UL (ref 1.2–4.8)
LYMPHOCYTES NFR BLD AUTO: 31.4 %
MCH RBC QN AUTO: 29.7 PG (ref 26–34)
MCHC RBC AUTO-ENTMCNC: 35.4 G/DL (ref 32–36)
MCV RBC AUTO: 84 FL (ref 80–100)
MONOCYTES # BLD AUTO: 0.56 X10*3/UL (ref 0.1–1)
MONOCYTES NFR BLD AUTO: 8.8 %
NEUTROPHILS # BLD AUTO: 3.49 X10*3/UL (ref 1.2–7.7)
NEUTROPHILS NFR BLD AUTO: 55 %
NRBC BLD-RTO: 0 /100 WBCS (ref 0–0)
PLATELET # BLD AUTO: 204 X10*3/UL (ref 150–450)
POTASSIUM SERPL-SCNC: 4.1 MMOL/L (ref 3.5–5.3)
PROT SERPL-MCNC: 7.3 G/DL (ref 6.4–8.2)
RBC # BLD AUTO: 5.19 X10*6/UL (ref 4.5–5.9)
SODIUM SERPL-SCNC: 137 MMOL/L (ref 136–145)
WBC # BLD AUTO: 6.3 X10*3/UL (ref 4.4–11.3)

## 2024-04-28 PROCEDURE — 2500000006 HC RX 250 W HCPCS SELF ADMINISTERED DRUGS (ALT 637 FOR ALL PAYERS)

## 2024-04-28 PROCEDURE — 80053 COMPREHEN METABOLIC PANEL: CPT

## 2024-04-28 PROCEDURE — 85025 COMPLETE CBC W/AUTO DIFF WBC: CPT

## 2024-04-28 PROCEDURE — 36415 COLL VENOUS BLD VENIPUNCTURE: CPT

## 2024-04-28 PROCEDURE — G0378 HOSPITAL OBSERVATION PER HR: HCPCS

## 2024-04-28 PROCEDURE — 2500000001 HC RX 250 WO HCPCS SELF ADMINISTERED DRUGS (ALT 637 FOR MEDICARE OP)

## 2024-04-28 RX ADMIN — HYDROXYZINE HYDROCHLORIDE 25 MG: 25 TABLET ORAL at 20:02

## 2024-04-28 RX ADMIN — SERTRALINE HYDROCHLORIDE 25 MG: 50 TABLET ORAL at 08:00

## 2024-04-28 RX ADMIN — RISPERIDONE 0.5 MG: 0.25 TABLET, FILM COATED ORAL at 08:01

## 2024-04-28 RX ADMIN — RISPERIDONE 0.5 MG: 0.25 TABLET, FILM COATED ORAL at 20:02

## 2024-04-28 RX ADMIN — HYDROXYZINE HYDROCHLORIDE 25 MG: 25 TABLET ORAL at 08:01

## 2024-04-28 RX ADMIN — DIVALPROEX SODIUM 500 MG: 250 TABLET, FILM COATED, EXTENDED RELEASE ORAL at 08:01

## 2024-04-28 ASSESSMENT — COGNITIVE AND FUNCTIONAL STATUS - GENERAL
MOBILITY SCORE: 24
DAILY ACTIVITIY SCORE: 24

## 2024-04-28 ASSESSMENT — PAIN SCALES - GENERAL
PAINLEVEL_OUTOF10: 0 - NO PAIN
PAINLEVEL_OUTOF10: 0 - NO PAIN
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
## 2024-04-29 ENCOUNTER — APPOINTMENT (OUTPATIENT)
Dept: PRIMARY CARE | Facility: CLINIC | Age: 24
End: 2024-04-29
Payer: COMMERCIAL

## 2024-04-29 PROBLEM — F84.9 PERVASIVE DEVELOPMENTAL DISORDER (HHS-HCC): Status: ACTIVE | Noted: 2023-03-13

## 2024-04-29 LAB
ALBUMIN SERPL BCP-MCNC: 4.1 G/DL (ref 3.4–5)
ALP SERPL-CCNC: 46 U/L (ref 33–120)
ALT SERPL W P-5'-P-CCNC: 16 U/L (ref 10–52)
ANION GAP SERPL CALC-SCNC: 11 MMOL/L (ref 10–20)
AST SERPL W P-5'-P-CCNC: 14 U/L (ref 9–39)
BASOPHILS # BLD AUTO: 0.04 X10*3/UL (ref 0–0.1)
BASOPHILS NFR BLD AUTO: 0.6 %
BILIRUB SERPL-MCNC: 0.4 MG/DL (ref 0–1.2)
BUN SERPL-MCNC: 13 MG/DL (ref 6–23)
CALCIUM SERPL-MCNC: 9 MG/DL (ref 8.6–10.3)
CHLORIDE SERPL-SCNC: 105 MMOL/L (ref 98–107)
CO2 SERPL-SCNC: 28 MMOL/L (ref 21–32)
CREAT SERPL-MCNC: 0.79 MG/DL (ref 0.5–1.3)
EGFRCR SERPLBLD CKD-EPI 2021: >90 ML/MIN/1.73M*2
EOSINOPHIL # BLD AUTO: 0.27 X10*3/UL (ref 0–0.7)
EOSINOPHIL NFR BLD AUTO: 4 %
ERYTHROCYTE [DISTWIDTH] IN BLOOD BY AUTOMATED COUNT: 12.3 % (ref 11.5–14.5)
GLUCOSE SERPL-MCNC: 99 MG/DL (ref 74–99)
HCT VFR BLD AUTO: 41 % (ref 41–52)
HGB BLD-MCNC: 14.4 G/DL (ref 13.5–17.5)
IMM GRANULOCYTES # BLD AUTO: 0.07 X10*3/UL (ref 0–0.7)
IMM GRANULOCYTES NFR BLD AUTO: 1 % (ref 0–0.9)
LYMPHOCYTES # BLD AUTO: 2.46 X10*3/UL (ref 1.2–4.8)
LYMPHOCYTES NFR BLD AUTO: 36.8 %
MCH RBC QN AUTO: 29.7 PG (ref 26–34)
MCHC RBC AUTO-ENTMCNC: 35.1 G/DL (ref 32–36)
MCV RBC AUTO: 85 FL (ref 80–100)
MONOCYTES # BLD AUTO: 0.64 X10*3/UL (ref 0.1–1)
MONOCYTES NFR BLD AUTO: 9.6 %
NEUTROPHILS # BLD AUTO: 3.2 X10*3/UL (ref 1.2–7.7)
NEUTROPHILS NFR BLD AUTO: 48 %
NRBC BLD-RTO: 0 /100 WBCS (ref 0–0)
PLATELET # BLD AUTO: 192 X10*3/UL (ref 150–450)
POTASSIUM SERPL-SCNC: 3.9 MMOL/L (ref 3.5–5.3)
PROT SERPL-MCNC: 6.3 G/DL (ref 6.4–8.2)
RBC # BLD AUTO: 4.85 X10*6/UL (ref 4.5–5.9)
SODIUM SERPL-SCNC: 140 MMOL/L (ref 136–145)
WBC # BLD AUTO: 6.7 X10*3/UL (ref 4.4–11.3)

## 2024-04-29 PROCEDURE — 2500000001 HC RX 250 WO HCPCS SELF ADMINISTERED DRUGS (ALT 637 FOR MEDICARE OP)

## 2024-04-29 PROCEDURE — 36415 COLL VENOUS BLD VENIPUNCTURE: CPT

## 2024-04-29 PROCEDURE — 85025 COMPLETE CBC W/AUTO DIFF WBC: CPT

## 2024-04-29 PROCEDURE — 80053 COMPREHEN METABOLIC PANEL: CPT

## 2024-04-29 PROCEDURE — 2500000006 HC RX 250 W HCPCS SELF ADMINISTERED DRUGS (ALT 637 FOR ALL PAYERS)

## 2024-04-29 PROCEDURE — G0378 HOSPITAL OBSERVATION PER HR: HCPCS

## 2024-04-29 PROCEDURE — 99232 SBSQ HOSP IP/OBS MODERATE 35: CPT

## 2024-04-29 RX ORDER — TALC
6 POWDER (GRAM) TOPICAL NIGHTLY
Status: DISCONTINUED | OUTPATIENT
Start: 2024-04-29 | End: 2024-05-16 | Stop reason: HOSPADM

## 2024-04-29 RX ADMIN — RISPERIDONE 0.5 MG: 0.25 TABLET, FILM COATED ORAL at 08:58

## 2024-04-29 RX ADMIN — HYDROXYZINE HYDROCHLORIDE 25 MG: 25 TABLET ORAL at 08:58

## 2024-04-29 RX ADMIN — HYDROXYZINE HYDROCHLORIDE 25 MG: 25 TABLET ORAL at 20:49

## 2024-04-29 RX ADMIN — SERTRALINE HYDROCHLORIDE 25 MG: 50 TABLET ORAL at 08:58

## 2024-04-29 RX ADMIN — Medication 6 MG: at 20:49

## 2024-04-29 RX ADMIN — DIVALPROEX SODIUM 500 MG: 250 TABLET, FILM COATED, EXTENDED RELEASE ORAL at 08:58

## 2024-04-29 RX ADMIN — RISPERIDONE 0.5 MG: 0.25 TABLET, FILM COATED ORAL at 20:49

## 2024-04-29 ASSESSMENT — COGNITIVE AND FUNCTIONAL STATUS - GENERAL
DAILY ACTIVITIY SCORE: 24
MOBILITY SCORE: 24

## 2024-04-29 ASSESSMENT — PAIN SCALES - GENERAL
PAINLEVEL_OUTOF10: 0 - NO PAIN
PAINLEVEL_OUTOF10: 0 - NO PAIN

## 2024-04-29 NOTE — PROGRESS NOTES
This  spoke with Maxine from the board of DD. Maxine reports that they are unlikely to have a group home fast as the patients behaviors will preclude him from many. Maxine reports he was just discharge from [facility] last week and the mom didn't want him to come home at that point. SW reached out to the only accepting facility [facility] to determine if they can accept for a respite stay. Awaiting response from them.  will continue to follow. Message with questions.  RANJANA Campbell

## 2024-04-29 NOTE — CARE PLAN
Problem: Pain  Goal: My pain/discomfort is manageable  Outcome: Progressing     Problem: Safety  Goal: Patient will be injury free during hospitalization  Outcome: Progressing  Goal: I will remain free of falls  Outcome: Progressing     Problem: Daily Care  Goal: Daily care needs are met  Outcome: Progressing     Problem: Psychosocial Needs  Goal: Demonstrates ability to cope with hospitalization/illness  Outcome: Progressing  Goal: Collaborate with me, my family, and caregiver to identify my specific goals  Outcome: Progressing     Problem: Discharge Barriers  Goal: My discharge needs are met  Outcome: Progressing     Problem: Skin  Goal: Decreased wound size/increased tissue granulation at next dressing change  Outcome: Progressing  Goal: Participates in plan/prevention/treatment measures  Outcome: Progressing  Goal: Prevent/manage excess moisture  Outcome: Progressing  Goal: Prevent/minimize sheer/friction injuries  Outcome: Progressing  Goal: Promote/optimize nutrition  Outcome: Progressing  Goal: Promote skin healing  Outcome: Progressing     Problem: Pain - Adult  Goal: Verbalizes/displays adequate comfort level or baseline comfort level  Outcome: Progressing     Problem: Safety - Adult  Goal: Free from fall injury  Outcome: Progressing     Problem: Discharge Planning  Goal: Discharge to home or other facility with appropriate resources  Outcome: Progressing     Problem: Chronic Conditions and Co-morbidities  Goal: Patient's chronic conditions and co-morbidity symptoms are monitored and maintained or improved  Outcome: Progressing   The patient's goals for the shift include jackie    The clinical goals for the shift include comfort and safety       Female
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
## 2024-04-30 PROCEDURE — G0378 HOSPITAL OBSERVATION PER HR: HCPCS

## 2024-04-30 PROCEDURE — 99232 SBSQ HOSP IP/OBS MODERATE 35: CPT

## 2024-04-30 PROCEDURE — 2500000006 HC RX 250 W HCPCS SELF ADMINISTERED DRUGS (ALT 637 FOR ALL PAYERS)

## 2024-04-30 PROCEDURE — 2500000001 HC RX 250 WO HCPCS SELF ADMINISTERED DRUGS (ALT 637 FOR MEDICARE OP)

## 2024-04-30 RX ADMIN — HYDROXYZINE HYDROCHLORIDE 25 MG: 25 TABLET ORAL at 08:49

## 2024-04-30 RX ADMIN — HYDROXYZINE HYDROCHLORIDE 25 MG: 25 TABLET ORAL at 21:19

## 2024-04-30 RX ADMIN — RISPERIDONE 0.5 MG: 0.25 TABLET, FILM COATED ORAL at 08:49

## 2024-04-30 RX ADMIN — Medication 6 MG: at 21:19

## 2024-04-30 RX ADMIN — SERTRALINE HYDROCHLORIDE 25 MG: 50 TABLET ORAL at 08:49

## 2024-04-30 RX ADMIN — DIVALPROEX SODIUM 500 MG: 250 TABLET, FILM COATED, EXTENDED RELEASE ORAL at 08:49

## 2024-04-30 RX ADMIN — RISPERIDONE 0.5 MG: 0.25 TABLET, FILM COATED ORAL at 21:19

## 2024-04-30 ASSESSMENT — COGNITIVE AND FUNCTIONAL STATUS - GENERAL
DAILY ACTIVITIY SCORE: 24
MOBILITY SCORE: 24

## 2024-04-30 ASSESSMENT — PAIN SCALES - GENERAL
PAINLEVEL_OUTOF10: 0 - NO PAIN
PAINLEVEL_OUTOF10: 0 - NO PAIN

## 2024-04-30 NOTE — CARE PLAN
Problem: Pain  Goal: My pain/discomfort is manageable  Outcome: Progressing     Problem: Safety  Goal: Patient will be injury free during hospitalization  Outcome: Progressing  Goal: I will remain free of falls  Outcome: Progressing     Problem: Daily Care  Goal: Daily care needs are met  Outcome: Progressing     Problem: Psychosocial Needs  Goal: Demonstrates ability to cope with hospitalization/illness  Outcome: Progressing  Goal: Collaborate with me, my family, and caregiver to identify my specific goals  Outcome: Progressing     Problem: Discharge Barriers  Goal: My discharge needs are met  Outcome: Progressing     Problem: Skin  Goal: Decreased wound size/increased tissue granulation at next dressing change  Outcome: Progressing  Goal: Participates in plan/prevention/treatment measures  Outcome: Progressing  Goal: Prevent/manage excess moisture  Outcome: Progressing  Goal: Prevent/minimize sheer/friction injuries  Outcome: Progressing  Goal: Promote/optimize nutrition  Outcome: Progressing  Goal: Promote skin healing  Outcome: Progressing     Problem: Pain - Adult  Goal: Verbalizes/displays adequate comfort level or baseline comfort level  Outcome: Progressing     Problem: Safety - Adult  Goal: Free from fall injury  Outcome: Progressing     Problem: Discharge Planning  Goal: Discharge to home or other facility with appropriate resources  Outcome: Progressing     Problem: Chronic Conditions and Co-morbidities  Goal: Patient's chronic conditions and co-morbidity symptoms are monitored and maintained or improved  Outcome: Progressing   The patient's goals for the shift include jackie    The clinical goals for the shift include safety and comfort
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
## 2024-04-30 NOTE — CARE PLAN
The patient's goals for the shift include jackie    The clinical goals for the shift include Maintain comfort      Problem: Chronic Conditions and Co-morbidities  Goal: Patient's chronic conditions and co-morbidity symptoms are monitored and maintained or improved  Outcome: Progressing     Problem: Chronic Conditions and Co-morbidities  Goal: Patient's chronic conditions and co-morbidity symptoms are monitored and maintained or improved  Outcome: Progressing     Problem: Discharge Planning  Goal: Discharge to home or other facility with appropriate resources  Outcome: Progressing     Problem: Safety - Adult  Goal: Free from fall injury  Outcome: Progressing     Problem: Pain - Adult  Goal: Verbalizes/displays adequate comfort level or baseline comfort level  Outcome: Progressing     Problem: Discharge Barriers  Goal: My discharge needs are met  Outcome: Progressing     Problem: Psychosocial Needs  Goal: Demonstrates ability to cope with hospitalization/illness  Outcome: Progressing  Goal: Collaborate with me, my family, and caregiver to identify my specific goals  Outcome: Progressing     Problem: Daily Care  Goal: Daily care needs are met  Outcome: Progressing     Problem: Safety  Goal: Patient will be injury free during hospitalization  Outcome: Progressing  Goal: I will remain free of falls  Outcome: Progressing     Problem: Pain  Goal: My pain/discomfort is manageable  Outcome: Progressing     Problem: Skin  Goal: Decreased wound size/increased tissue granulation at next dressing change  Outcome: Progressing  Flowsheets (Taken 4/30/2024 0242)  Decreased wound size/increased tissue granulation at next dressing change: Promote sleep for wound healing  Goal: Participates in plan/prevention/treatment measures  Outcome: Progressing  Flowsheets (Taken 4/30/2024 0242)  Participates in plan/prevention/treatment measures: Elevate heels  Goal: Prevent/manage excess moisture  Outcome: Progressing  Flowsheets (Taken 4/30/2024  0242)  Prevent/manage excess moisture: Moisturize dry skin  Goal: Prevent/minimize sheer/friction injuries  Outcome: Progressing  Flowsheets (Taken 4/30/2024 0242)  Prevent/minimize sheer/friction injuries: Use pull sheet  Goal: Promote/optimize nutrition  Outcome: Progressing  Flowsheets (Taken 4/30/2024 0242)  Promote/optimize nutrition: Monitor/record intake including meals  Goal: Promote skin healing  Outcome: Progressing  Flowsheets (Taken 4/30/2024 0242)  Promote skin healing: Assess skin/pad under line(s)/device(s)

## 2024-04-30 NOTE — PROGRESS NOTES
CANDELARIO reviewed [facility] but they are needing his social security #. SW called and left voicemail for patients mother requesting a phone call back.  will continue to follow. Message with questions.  UPDATE [id number] This  talked with patients mother. She is agreeable with [facility] to the [facility] with the potential of going home vs. Group home. SW provided social security number to the [facility] and will ask them to obtain precert for respite stay.   RANJANA Campbell

## 2024-05-01 PROCEDURE — 2500000001 HC RX 250 WO HCPCS SELF ADMINISTERED DRUGS (ALT 637 FOR MEDICARE OP)

## 2024-05-01 PROCEDURE — G0378 HOSPITAL OBSERVATION PER HR: HCPCS

## 2024-05-01 PROCEDURE — 2500000006 HC RX 250 W HCPCS SELF ADMINISTERED DRUGS (ALT 637 FOR ALL PAYERS)

## 2024-05-01 PROCEDURE — 99232 SBSQ HOSP IP/OBS MODERATE 35: CPT

## 2024-05-01 RX ADMIN — RISPERIDONE 0.5 MG: 0.25 TABLET, FILM COATED ORAL at 21:09

## 2024-05-01 RX ADMIN — HYDROXYZINE HYDROCHLORIDE 25 MG: 25 TABLET ORAL at 09:46

## 2024-05-01 RX ADMIN — SERTRALINE HYDROCHLORIDE 25 MG: 50 TABLET ORAL at 09:46

## 2024-05-01 RX ADMIN — RISPERIDONE 0.5 MG: 0.25 TABLET, FILM COATED ORAL at 09:46

## 2024-05-01 RX ADMIN — Medication 6 MG: at 21:09

## 2024-05-01 RX ADMIN — HYDROXYZINE HYDROCHLORIDE 25 MG: 25 TABLET ORAL at 21:09

## 2024-05-01 RX ADMIN — DIVALPROEX SODIUM 500 MG: 250 TABLET, FILM COATED, EXTENDED RELEASE ORAL at 13:22

## 2024-05-01 ASSESSMENT — COGNITIVE AND FUNCTIONAL STATUS - GENERAL
DAILY ACTIVITIY SCORE: 24
MOBILITY SCORE: 24
MOBILITY SCORE: 24
DAILY ACTIVITIY SCORE: 24

## 2024-05-01 ASSESSMENT — PAIN - FUNCTIONAL ASSESSMENT: PAIN_FUNCTIONAL_ASSESSMENT: 0-10

## 2024-05-01 ASSESSMENT — PAIN SCALES - GENERAL
PAINLEVEL_OUTOF10: 0 - NO PAIN
PAINLEVEL_OUTOF10: 0 - NO PAIN

## 2024-05-01 NOTE — PROGRESS NOTES
05/01/24 1145   Discharge Planning   Patient expects to be discharged to: Plan for Respite placement. SW who covered yesterday sent message to medical team about completing goldenrod in order to complete PASSR. Waiting on completion of goldenrod and will then request DSC complete PASSR.     ADDED 1313: DSC completed PASSR. Pt tripped the screen and the board of developmental disablities who have 7-14 buisness days to review pt case.
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
## 2024-05-01 NOTE — CARE PLAN
Problem: Skin  Goal: Decreased wound size/increased tissue granulation at next dressing change  Outcome: Progressing  Goal: Participates in plan/prevention/treatment measures  Outcome: Progressing  Goal: Prevent/manage excess moisture  Outcome: Progressing  Goal: Prevent/minimize sheer/friction injuries  Outcome: Progressing  Goal: Promote/optimize nutrition  Outcome: Progressing  Goal: Promote skin healing  Outcome: Progressing     Problem: Pain  Goal: My pain/discomfort is manageable  Outcome: Progressing     Problem: Safety  Goal: Patient will be injury free during hospitalization  Outcome: Progressing  Goal: I will remain free of falls  Outcome: Progressing   The patient's goals for the shift include jackie    The clinical goals for the shift include safety and comfort

## 2024-05-01 NOTE — CARE PLAN
The patient's goals for the shift include jackie    The clinical goals for the shift include Maintain comfort        Skin  Add All  Decreased wound size/increased tissue granulation at next dressing change  Add  Today at 0236 - Progressing by Dian Matthews RN  Add  Flowsheets  Taken today at 0236  Decreased wound size/increased tissue granulation at next dressing change  Promote sleep for wound healing  Participates in plan/prevention/treatment measures  Add  Today at 0236 - Progressing by Dian Matthews RN  Add  Flowsheets  Taken today at 0236  Participates in plan/prevention/treatment measures  Elevate heels  Prevent/manage excess moisture  Add  Today at 0236 - Progressing by Dian Matthews RN  Add  Flowsheets  Taken today at 0236  Prevent/manage excess moisture  Moisturize dry skin  Prevent/minimize sheer/friction injuries  Add  Today at 0236 - Progressing by Dian Matthews RN  Add  Flowsheets  Taken today at 0236  Prevent/minimize sheer/friction injuries  Use pull sheet  Promote/optimize nutrition  Add  Today at 0236 - Progressing by Dian Matthews RN  Add  Flowsheets  Taken today at 0236  Promote/optimize nutrition  Offer water/supplements/favorite foods  Promote skin healing  Add  Today at 0236 - Progressing by Dian Matthews RN  Add  Flowsheets  Taken today at 0236  Promote skin healing  Assess skin/pad under line(s)/device(s)  Pain  Add All  My pain/discomfort is manageable  Add  Today at 0236 - Progressing by Dian Matthews RN  Add  Safety  Add All  Patient will be injury free during hospitalization  Add  Today at 0236 - Progressing by Dian Matthews RN  Add  I will remain free of falls  Add  Today at 0236 - Progressing by Dian Matthews RN  Add  Daily Care  Add All  Daily care needs are met  Add  Today at 0236 - Progressing by Dian Matthews RN  Add  Psychosocial Needs  Add All  Demonstrates ability to cope with hospitalization/illness  Add  Today  at 0236 - Progressing by Dian Matthews RN  Add  Collaborate with me, my family, and caregiver to identify my specific goals  Add  Today at 0236 - Progressing by Dian Matthews RN  Add  Discharge Barriers  Add All  My discharge needs are met  Add  Today at 0236 - Progressing by Dian Matthews RN  Add  Pain - Adult  Add All  Verbalizes/displays adequate comfort level or baseline comfort level  Add  Today at 0236 - Progressing by Dian Matthews RN  Add  Safety - Adult  Add All  Free from fall injury  Add  Today at 0236 - Progressing by Dian Matthews RN  Add  Discharge Planning  Add All  Discharge to home or other facility with appropriate resources  Add  Today at 0236 - Progressing by Dian Matthews RN  Add  Chronic Conditions and Co-morbidities  Add All  Patient's chronic conditions and co-morbidity symptoms are monitored and maintained or improved  Add  Today at 0236 - Progressing by Dian Matthews RN  Add

## 2024-05-02 PROCEDURE — 2500000001 HC RX 250 WO HCPCS SELF ADMINISTERED DRUGS (ALT 637 FOR MEDICARE OP)

## 2024-05-02 PROCEDURE — 99232 SBSQ HOSP IP/OBS MODERATE 35: CPT

## 2024-05-02 PROCEDURE — G0378 HOSPITAL OBSERVATION PER HR: HCPCS

## 2024-05-02 PROCEDURE — 2500000006 HC RX 250 W HCPCS SELF ADMINISTERED DRUGS (ALT 637 FOR ALL PAYERS)

## 2024-05-02 RX ADMIN — RISPERIDONE 0.5 MG: 0.25 TABLET, FILM COATED ORAL at 21:02

## 2024-05-02 RX ADMIN — HYDROXYZINE HYDROCHLORIDE 25 MG: 25 TABLET ORAL at 21:02

## 2024-05-02 RX ADMIN — Medication 6 MG: at 21:01

## 2024-05-02 RX ADMIN — RISPERIDONE 0.5 MG: 0.25 TABLET, FILM COATED ORAL at 08:19

## 2024-05-02 RX ADMIN — HYDROXYZINE HYDROCHLORIDE 25 MG: 25 TABLET ORAL at 08:19

## 2024-05-02 RX ADMIN — DIVALPROEX SODIUM 500 MG: 250 TABLET, FILM COATED, EXTENDED RELEASE ORAL at 08:19

## 2024-05-02 RX ADMIN — SERTRALINE HYDROCHLORIDE 25 MG: 50 TABLET ORAL at 08:19

## 2024-05-02 ASSESSMENT — COGNITIVE AND FUNCTIONAL STATUS - GENERAL
MOBILITY SCORE: 24
DAILY ACTIVITIY SCORE: 24
DAILY ACTIVITIY SCORE: 24
MOBILITY SCORE: 24

## 2024-05-02 ASSESSMENT — PAIN SCALES - GENERAL
PAINLEVEL_OUTOF10: 0 - NO PAIN
PAINLEVEL_OUTOF10: 0 - NO PAIN

## 2024-05-02 ASSESSMENT — PAIN - FUNCTIONAL ASSESSMENT
PAIN_FUNCTIONAL_ASSESSMENT: 0-10
PAIN_FUNCTIONAL_ASSESSMENT: 0-10
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
## 2024-05-02 NOTE — CARE PLAN
The patient's goals for the shift include jackie    The clinical goals for the shift include safety and comfort      Problem: Pain  Goal: My pain/discomfort is manageable  Outcome: Progressing     Problem: Safety  Goal: Patient will be injury free during hospitalization  Outcome: Progressing  Goal: I will remain free of falls  Outcome: Progressing     Problem: Psychosocial Needs  Goal: Demonstrates ability to cope with hospitalization/illness  Outcome: Progressing  Goal: Collaborate with me, my family, and caregiver to identify my specific goals  Outcome: Progressing     Problem: Skin  Goal: Decreased wound size/increased tissue granulation at next dressing change  Outcome: Progressing  Goal: Participates in plan/prevention/treatment measures  Outcome: Progressing  Goal: Prevent/manage excess moisture  Outcome: Progressing  Goal: Prevent/minimize sheer/friction injuries  Outcome: Progressing  Goal: Promote/optimize nutrition  Outcome: Progressing  Goal: Promote skin healing  Outcome: Progressing     Problem: Pain - Adult  Goal: Verbalizes/displays adequate comfort level or baseline comfort level  Outcome: Progressing     Problem: Safety - Adult  Goal: Free from fall injury  Outcome: Progressing

## 2024-05-03 PROCEDURE — 2500000001 HC RX 250 WO HCPCS SELF ADMINISTERED DRUGS (ALT 637 FOR MEDICARE OP)

## 2024-05-03 PROCEDURE — 99232 SBSQ HOSP IP/OBS MODERATE 35: CPT

## 2024-05-03 PROCEDURE — 2500000005 HC RX 250 GENERAL PHARMACY W/O HCPCS: Performed by: STUDENT IN AN ORGANIZED HEALTH CARE EDUCATION/TRAINING PROGRAM

## 2024-05-03 PROCEDURE — G0378 HOSPITAL OBSERVATION PER HR: HCPCS

## 2024-05-03 PROCEDURE — 2500000006 HC RX 250 W HCPCS SELF ADMINISTERED DRUGS (ALT 637 FOR ALL PAYERS)

## 2024-05-03 RX ORDER — ONDANSETRON 4 MG/1
4 TABLET, FILM COATED ORAL EVERY 8 HOURS PRN
Status: DISCONTINUED | OUTPATIENT
Start: 2024-05-03 | End: 2024-05-16 | Stop reason: HOSPADM

## 2024-05-03 RX ADMIN — DIVALPROEX SODIUM 500 MG: 250 TABLET, FILM COATED, EXTENDED RELEASE ORAL at 08:14

## 2024-05-03 RX ADMIN — ONDANSETRON HYDROCHLORIDE 4 MG: 4 TABLET, FILM COATED ORAL at 21:53

## 2024-05-03 RX ADMIN — RISPERIDONE 0.5 MG: 0.25 TABLET, FILM COATED ORAL at 08:14

## 2024-05-03 RX ADMIN — SERTRALINE HYDROCHLORIDE 25 MG: 50 TABLET ORAL at 08:14

## 2024-05-03 RX ADMIN — HYDROXYZINE HYDROCHLORIDE 25 MG: 25 TABLET ORAL at 08:14

## 2024-05-03 RX ADMIN — Medication 6 MG: at 21:06

## 2024-05-03 RX ADMIN — HYDROXYZINE HYDROCHLORIDE 25 MG: 25 TABLET ORAL at 21:06

## 2024-05-03 RX ADMIN — RISPERIDONE 0.5 MG: 0.25 TABLET, FILM COATED ORAL at 21:06

## 2024-05-03 ASSESSMENT — PAIN SCALES - GENERAL
PAINLEVEL_OUTOF10: 0 - NO PAIN
PAINLEVEL_OUTOF10: 0 - NO PAIN

## 2024-05-03 ASSESSMENT — COGNITIVE AND FUNCTIONAL STATUS - GENERAL
DAILY ACTIVITIY SCORE: 24
MOBILITY SCORE: 24

## 2024-05-03 ASSESSMENT — PAIN - FUNCTIONAL ASSESSMENT: PAIN_FUNCTIONAL_ASSESSMENT: 0-10

## 2024-05-03 NOTE — PROGRESS NOTES
SW spoke to patient's mom who stated anticipated respite stay is about 2-3 weeks.  The plan is for patient to get regulated with behavior and then go home or to a group home.  Patient's mom is hoping to get patient in an adult day program.  CANDELARIO stated to mom PASRR is still pending.  CANDELARIO will continue to follow up.    LUL STARKS LCSW
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
## 2024-05-03 NOTE — CARE PLAN
The patient's goals for the shift include jackie    The clinical goals for the shift include safety amd comfort

## 2024-05-03 NOTE — CARE PLAN
The patient's goals for the shift include jackie    The clinical goals for the shift include safety and comfort    Problem: Safety  Goal: Patient will be injury free during hospitalization  Outcome: Progressing  Goal: I will remain free of falls  Outcome: Progressing     Problem: Pain - Adult  Goal: Verbalizes/displays adequate comfort level or baseline comfort level  Outcome: Progressing

## 2024-05-03 NOTE — CARE PLAN
The patient's goals for the shift include jackie    The clinical goals for the shift include safety and comfort

## 2024-05-03 NOTE — CONSULTS
Dietitian consult ordered for LOS.  Pt is hospital day #8. MST=0. Pt eating % of meals.  No pressure injuries noted on flow Sheet. Per EMR review, no unintentional weight loss noted within the past year.  No muscle wasting or fat loss apparent.    Full nutrition assessment not warranted at this time.  This service remains available, please reconsult as nutrition needs arise.
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
## 2024-05-04 PROBLEM — F84.0 AUTISM (HHS-HCC): Status: ACTIVE | Noted: 2024-05-04

## 2024-05-04 PROCEDURE — 2500000001 HC RX 250 WO HCPCS SELF ADMINISTERED DRUGS (ALT 637 FOR MEDICARE OP)

## 2024-05-04 PROCEDURE — 99232 SBSQ HOSP IP/OBS MODERATE 35: CPT | Performed by: STUDENT IN AN ORGANIZED HEALTH CARE EDUCATION/TRAINING PROGRAM

## 2024-05-04 PROCEDURE — G0378 HOSPITAL OBSERVATION PER HR: HCPCS

## 2024-05-04 PROCEDURE — 2500000004 HC RX 250 GENERAL PHARMACY W/ HCPCS (ALT 636 FOR OP/ED)

## 2024-05-04 PROCEDURE — 2500000006 HC RX 250 W HCPCS SELF ADMINISTERED DRUGS (ALT 637 FOR ALL PAYERS)

## 2024-05-04 RX ORDER — POLYETHYLENE GLYCOL 3350 17 G/17G
17 POWDER, FOR SOLUTION ORAL DAILY
Status: DISCONTINUED | OUTPATIENT
Start: 2024-05-04 | End: 2024-05-16 | Stop reason: HOSPADM

## 2024-05-04 RX ADMIN — DIVALPROEX SODIUM 500 MG: 250 TABLET, FILM COATED, EXTENDED RELEASE ORAL at 09:05

## 2024-05-04 RX ADMIN — HYDROXYZINE HYDROCHLORIDE 25 MG: 25 TABLET ORAL at 20:36

## 2024-05-04 RX ADMIN — RISPERIDONE 0.5 MG: 0.25 TABLET, FILM COATED ORAL at 20:36

## 2024-05-04 RX ADMIN — RISPERIDONE 0.5 MG: 0.25 TABLET, FILM COATED ORAL at 09:04

## 2024-05-04 RX ADMIN — SERTRALINE HYDROCHLORIDE 25 MG: 50 TABLET ORAL at 09:05

## 2024-05-04 RX ADMIN — HYDROXYZINE HYDROCHLORIDE 25 MG: 25 TABLET ORAL at 09:05

## 2024-05-04 RX ADMIN — POLYETHYLENE GLYCOL 3350 17 G: 17 POWDER, FOR SOLUTION ORAL at 11:05

## 2024-05-04 RX ADMIN — Medication 6 MG: at 20:36

## 2024-05-04 ASSESSMENT — PAIN SCALES - GENERAL
PAINLEVEL_OUTOF10: 0 - NO PAIN
PAINLEVEL_OUTOF10: 0 - NO PAIN

## 2024-05-04 ASSESSMENT — PAIN - FUNCTIONAL ASSESSMENT
PAIN_FUNCTIONAL_ASSESSMENT: 0-10
PAIN_FUNCTIONAL_ASSESSMENT: 0-10

## 2024-05-04 NOTE — CARE PLAN
The patient's goals for the shift include jackie    The clinical goals for the shift include maintain safety and comfort    Problem: Pain  Goal: My pain/discomfort is manageable  Outcome: Progressing     Problem: Safety  Goal: Patient will be injury free during hospitalization  Outcome: Progressing  Goal: I will remain free of falls  Outcome: Progressing

## 2024-05-04 NOTE — PROGRESS NOTES
Juliocesar Walker is a 24 y.o. male on day 0 of admission presenting with Autism spectrum disorder ([facility]).      Subjective   Patient says that he would like to go home, is constipated and points to distended abdomen.       Objective     Last Recorded Vitals  /51 (BP Location: Right arm, Patient Position: Lying)   Pulse 110   Temp 37 °C (98.6 °F) (Temporal)   Resp 20   Wt 69 kg (152 lb 1.9 oz)   SpO2 96%   Intake/Output last 3 Shifts:    Intake/Output Summary (Last 24 hours) at 5/4/2024 1028  Last data filed at 5/3/2024 2130  Gross per 24 hour   Intake --   Output 1 ml   Net -1 ml       Admission Weight  Weight: 68 kg (150 lb) (04/24/24 2057)    Daily Weight  04/25/24 : 69 kg (152 lb 1.9 oz)    Image Results  ECG 12 lead  Normal sinus rhythm  Normal ECG  When compared with ECG of 16-FEB-2024 20:26,  Vent. rate has increased BY  32 BPM  See ED provider note for full interpretation and clinical correlation  Confirmed by Kimberlee Rojas ([id number]) on 4/8/2024 3:59:12 PM      Physical Exam  General: Alert and cooperative  Cardiovascular: Regular rate and rhythm  Respiratory: Clear to auscultation bilaterally  Gastrointestinal: Abdomen slightly distended, nontender  Neurological: No gross focal neurologic deficits.  Extremities: Warm and dry    Relevant Results               Assessment/Plan        Admission Details  24-year-old male with a history of autism, OCD brought to the hospital by mother due to reported aggressive incidents at home.    Acute Medical Conditions  #Autism  #OCD  -Continue patient's home risperidone, sertraline, hydroxyzine and Depakote ER  -Patient's guanfacine ER not available in formulary, contacted patient's mother and advised her to bring this into the hospital.  Although may refrain from giving this to the patient because he has had no aggressive behavior in the last 10 days while admitted.  Possible that his aggression was secondary to guanfacine  -Patient pending placement for  respite care, awaiting Department of developmental services    #Constipation  -Start daily MiraLAX    DVT: Ambulation  Diet: Regular  Fluids: None  Electrolytes: None  Dispo: Respite care  Code Status: Full code  Consults: None  Antibiotics: None    Dr. Boyd Merlos   Internal Medicine PGY-2  Available on [employer] for any questions.    This is a preliminary note pending signature from the attending physician.

## 2024-05-05 LAB
ALBUMIN SERPL BCP-MCNC: 4.1 G/DL (ref 3.4–5)
ALP SERPL-CCNC: 49 U/L (ref 33–120)
ALT SERPL W P-5'-P-CCNC: 32 U/L (ref 10–52)
ANION GAP SERPL CALC-SCNC: 12 MMOL/L (ref 10–20)
AST SERPL W P-5'-P-CCNC: 18 U/L (ref 9–39)
BILIRUB SERPL-MCNC: 0.5 MG/DL (ref 0–1.2)
BUN SERPL-MCNC: 11 MG/DL (ref 6–23)
CALCIUM SERPL-MCNC: 9.1 MG/DL (ref 8.6–10.3)
CHLORIDE SERPL-SCNC: 106 MMOL/L (ref 98–107)
CO2 SERPL-SCNC: 28 MMOL/L (ref 21–32)
CREAT SERPL-MCNC: 0.85 MG/DL (ref 0.5–1.3)
EGFRCR SERPLBLD CKD-EPI 2021: >90 ML/MIN/1.73M*2
ERYTHROCYTE [DISTWIDTH] IN BLOOD BY AUTOMATED COUNT: 12.5 % (ref 11.5–14.5)
GLUCOSE SERPL-MCNC: 102 MG/DL (ref 74–99)
HCT VFR BLD AUTO: 40.6 % (ref 41–52)
HGB BLD-MCNC: 14 G/DL (ref 13.5–17.5)
MCH RBC QN AUTO: 29.4 PG (ref 26–34)
MCHC RBC AUTO-ENTMCNC: 34.5 G/DL (ref 32–36)
MCV RBC AUTO: 85 FL (ref 80–100)
NRBC BLD-RTO: 0 /100 WBCS (ref 0–0)
PLATELET # BLD AUTO: 208 X10*3/UL (ref 150–450)
POTASSIUM SERPL-SCNC: 4.2 MMOL/L (ref 3.5–5.3)
PROT SERPL-MCNC: 6.5 G/DL (ref 6.4–8.2)
RBC # BLD AUTO: 4.76 X10*6/UL (ref 4.5–5.9)
SODIUM SERPL-SCNC: 142 MMOL/L (ref 136–145)
WBC # BLD AUTO: 6 X10*3/UL (ref 4.4–11.3)

## 2024-05-05 PROCEDURE — 2500000006 HC RX 250 W HCPCS SELF ADMINISTERED DRUGS (ALT 637 FOR ALL PAYERS)

## 2024-05-05 PROCEDURE — 36415 COLL VENOUS BLD VENIPUNCTURE: CPT

## 2024-05-05 PROCEDURE — 2500000004 HC RX 250 GENERAL PHARMACY W/ HCPCS (ALT 636 FOR OP/ED)

## 2024-05-05 PROCEDURE — 80053 COMPREHEN METABOLIC PANEL: CPT

## 2024-05-05 PROCEDURE — 2500000001 HC RX 250 WO HCPCS SELF ADMINISTERED DRUGS (ALT 637 FOR MEDICARE OP)

## 2024-05-05 PROCEDURE — 85027 COMPLETE CBC AUTOMATED: CPT

## 2024-05-05 PROCEDURE — 99232 SBSQ HOSP IP/OBS MODERATE 35: CPT | Performed by: STUDENT IN AN ORGANIZED HEALTH CARE EDUCATION/TRAINING PROGRAM

## 2024-05-05 PROCEDURE — G0378 HOSPITAL OBSERVATION PER HR: HCPCS

## 2024-05-05 RX ADMIN — SERTRALINE HYDROCHLORIDE 25 MG: 50 TABLET ORAL at 08:42

## 2024-05-05 RX ADMIN — HYDROXYZINE HYDROCHLORIDE 25 MG: 25 TABLET ORAL at 21:52

## 2024-05-05 RX ADMIN — POLYETHYLENE GLYCOL 3350 17 G: 17 POWDER, FOR SOLUTION ORAL at 08:42

## 2024-05-05 RX ADMIN — HYDROXYZINE HYDROCHLORIDE 25 MG: 25 TABLET ORAL at 08:42

## 2024-05-05 RX ADMIN — RISPERIDONE 0.5 MG: 0.25 TABLET, FILM COATED ORAL at 08:42

## 2024-05-05 RX ADMIN — DIVALPROEX SODIUM 500 MG: 250 TABLET, FILM COATED, EXTENDED RELEASE ORAL at 08:42

## 2024-05-05 RX ADMIN — Medication 6 MG: at 21:52

## 2024-05-05 RX ADMIN — RISPERIDONE 0.5 MG: 0.25 TABLET, FILM COATED ORAL at 21:52

## 2024-05-05 ASSESSMENT — PAIN SCALES - GENERAL
PAINLEVEL_OUTOF10: 0 - NO PAIN
PAINLEVEL_OUTOF10: 0 - NO PAIN

## 2024-05-05 ASSESSMENT — PAIN - FUNCTIONAL ASSESSMENT: PAIN_FUNCTIONAL_ASSESSMENT: 0-10

## 2024-05-05 NOTE — PROGRESS NOTES
Juliocesar Walker is a 24 y.o. male on day 0 of admission presenting with Autism spectrum disorder ([facility]).      Subjective   Patient says that he would like to go home, says his abdomen is feeling better today.     Objective     Last Recorded Vitals  /56   Pulse 85   Temp 37.3 °C (99.1 °F)   Resp 20   Wt 69 kg (152 lb 1.9 oz)   SpO2 96%   Intake/Output last 3 Shifts:  No intake or output data in the 24 hours ending 05/05/24 1127      Admission Weight  Weight: 68 kg (150 lb) (04/24/24 2057)    Daily Weight  04/25/24 : 69 kg (152 lb 1.9 oz)    Image Results  ECG 12 lead  Normal sinus rhythm  Normal ECG  When compared with ECG of 16-FEB-2024 20:26,  Vent. rate has increased BY  32 BPM  See ED provider note for full interpretation and clinical correlation  Confirmed by Kimberlee Rojas ([id number]) on 4/8/2024 3:59:12 PM      Physical Exam  General: Alert and cooperative  Cardiovascular: Regular rate and rhythm  Respiratory: Clear to auscultation bilaterally  Gastrointestinal: Abdomen slightly distended, nontender  Neurological: No gross focal neurologic deficits.  Extremities: Warm and dry    Relevant Results               Assessment/Plan        Admission Details  24-year-old male with a history of autism, OCD brought to the hospital by mother due to reported aggressive incidents at home.    Acute Medical Conditions  #Autism  #OCD  -Continue patient's home risperidone, sertraline, hydroxyzine and Depakote ER  -Patient's guanfacine ER not available in formulary, contacted patient's mother and advised her to bring this into the hospital.  Although may refrain from giving this to the patient because he has had no aggressive behavior in the last 10 days while admitted.  Possible that his aggression was secondary to guanfacine  -Patient pending placement for respite care, awaiting Department of developmental services  -Provided patient with activities do throughout the day such as coloring, card  games    #Constipation  - daily MiraLAX  -Can stop once patient's symptoms fully resolved    DVT: Ambulation  Diet: Regular  Fluids: None  Electrolytes: None  Dispo: Respite care  Code Status: Full code  Consults: None  Antibiotics: None    Dr. Boyd Merlos   Internal Medicine PGY-2  Available on [employer] for any questions.    This is a preliminary note pending signature from the attending physician.

## 2024-05-06 PROCEDURE — 99231 SBSQ HOSP IP/OBS SF/LOW 25: CPT | Performed by: STUDENT IN AN ORGANIZED HEALTH CARE EDUCATION/TRAINING PROGRAM

## 2024-05-06 PROCEDURE — G0378 HOSPITAL OBSERVATION PER HR: HCPCS

## 2024-05-06 PROCEDURE — 2500000001 HC RX 250 WO HCPCS SELF ADMINISTERED DRUGS (ALT 637 FOR MEDICARE OP)

## 2024-05-06 PROCEDURE — 2500000006 HC RX 250 W HCPCS SELF ADMINISTERED DRUGS (ALT 637 FOR ALL PAYERS)

## 2024-05-06 RX ADMIN — HYDROXYZINE HYDROCHLORIDE 25 MG: 25 TABLET ORAL at 09:04

## 2024-05-06 RX ADMIN — DIVALPROEX SODIUM 500 MG: 250 TABLET, FILM COATED, EXTENDED RELEASE ORAL at 09:05

## 2024-05-06 RX ADMIN — Medication 6 MG: at 21:26

## 2024-05-06 RX ADMIN — RISPERIDONE 0.5 MG: 0.25 TABLET, FILM COATED ORAL at 09:04

## 2024-05-06 RX ADMIN — SERTRALINE HYDROCHLORIDE 25 MG: 50 TABLET ORAL at 09:04

## 2024-05-06 RX ADMIN — HYDROXYZINE HYDROCHLORIDE 25 MG: 25 TABLET ORAL at 21:26

## 2024-05-06 RX ADMIN — RISPERIDONE 0.5 MG: 0.25 TABLET, FILM COATED ORAL at 21:26

## 2024-05-06 ASSESSMENT — COGNITIVE AND FUNCTIONAL STATUS - GENERAL
MOBILITY SCORE: 24
DAILY ACTIVITIY SCORE: 24

## 2024-05-06 ASSESSMENT — PAIN SCALES - GENERAL: PAINLEVEL_OUTOF10: 0 - NO PAIN

## 2024-05-06 NOTE — CARE PLAN
The patient's goals for the shift include jackie    The clinical goals for the shift include maintain safety

## 2024-05-06 NOTE — PROGRESS NOTES
Subjective:  No acute events. Patient reports he is eating and drinking without issues. Denies any new complaints.     Objective:  Visit Vitals  BP 98/56   Pulse 79   Temp 37.1 °C (98.8 °F)   Resp 20      Physical Exam:   General: Alert and cooperative  Cardiovascular: Regular rate and rhythm  Respiratory: Clear to auscultation bilaterally  Gastrointestinal: Abdomen slightly distended, nontender  Neurological: No gross focal neurologic deficits.  Extremities: Warm and dry       Lab Results   Component Value Date    WBC 6.0 05/05/2024    HGB 14.0 05/05/2024    HCT 40.6 (L) 05/05/2024    MCV 85 05/05/2024     05/05/2024      Lab Results   Component Value Date    GLUCOSE 102 (H) 05/05/2024    CALCIUM 9.1 05/05/2024     05/05/2024    K 4.2 05/05/2024    CO2 28 05/05/2024     05/05/2024    BUN 11 05/05/2024    CREATININE 0.85 05/05/2024      Medications:  divalproex, 500 mg, oral, Daily  hydrOXYzine HCL, 25 mg, oral, BID  melatonin, 6 mg, oral, Nightly  polyethylene glycol, 17 g, oral, Daily  risperiDONE, 0.5 mg, oral, BID  sertraline, 25 mg, oral, Daily       Assessment/ Plan:  24-year-old male with a history of autism, OCD brought to the hospital by mother due to reported aggressive incidents at home.     Acute Medical Conditions  #Autism spectrum disorder  #OCD  -Continue patient's home risperidone, sertraline, hydroxyzine and Depakote ER  -Patient's guanfacine ER not available in formulary, contacted patient's mother and advised her to bring this into the hospital.  Although may refrain from giving this to the patient because he has had no aggressive behavior in the last 10 days while admitted.  Possible that his aggression was secondary to guanfacine  -Patient pending placement for respite care, awaiting Department of developmental services  -Provided patient with activities do throughout the day such as coloring, card games     #Constipation  - daily MiraLAX  -Can stop once patient's symptoms  fully resolved     DVT: Ambulation  Diet: Regular  Fluids: None  Electrolytes: None  Dispo: Respite care  Code Status: Full code  Consults: None  Antibiotics: None    Yelena Gregory D.O.  Internal Medicine PGY-3    This is a preliminary note with physician attestation to follow.

## 2024-05-07 PROCEDURE — 2500000004 HC RX 250 GENERAL PHARMACY W/ HCPCS (ALT 636 FOR OP/ED)

## 2024-05-07 PROCEDURE — 2500000001 HC RX 250 WO HCPCS SELF ADMINISTERED DRUGS (ALT 637 FOR MEDICARE OP)

## 2024-05-07 PROCEDURE — 2500000006 HC RX 250 W HCPCS SELF ADMINISTERED DRUGS (ALT 637 FOR ALL PAYERS)

## 2024-05-07 PROCEDURE — 99231 SBSQ HOSP IP/OBS SF/LOW 25: CPT | Performed by: STUDENT IN AN ORGANIZED HEALTH CARE EDUCATION/TRAINING PROGRAM

## 2024-05-07 PROCEDURE — G0378 HOSPITAL OBSERVATION PER HR: HCPCS

## 2024-05-07 RX ADMIN — RISPERIDONE 0.5 MG: 0.25 TABLET, FILM COATED ORAL at 20:22

## 2024-05-07 RX ADMIN — HYDROXYZINE HYDROCHLORIDE 25 MG: 25 TABLET ORAL at 08:12

## 2024-05-07 RX ADMIN — DIVALPROEX SODIUM 500 MG: 250 TABLET, FILM COATED, EXTENDED RELEASE ORAL at 08:12

## 2024-05-07 RX ADMIN — Medication 6 MG: at 20:22

## 2024-05-07 RX ADMIN — HYDROXYZINE HYDROCHLORIDE 25 MG: 25 TABLET ORAL at 20:22

## 2024-05-07 RX ADMIN — RISPERIDONE 0.5 MG: 0.25 TABLET, FILM COATED ORAL at 08:12

## 2024-05-07 RX ADMIN — SERTRALINE HYDROCHLORIDE 25 MG: 50 TABLET ORAL at 08:12

## 2024-05-07 RX ADMIN — POLYETHYLENE GLYCOL 3350 17 G: 17 POWDER, FOR SOLUTION ORAL at 08:12

## 2024-05-07 ASSESSMENT — COGNITIVE AND FUNCTIONAL STATUS - GENERAL
DAILY ACTIVITIY SCORE: 24
MOBILITY SCORE: 24

## 2024-05-07 ASSESSMENT — PAIN - FUNCTIONAL ASSESSMENT: PAIN_FUNCTIONAL_ASSESSMENT: 0-10

## 2024-05-07 ASSESSMENT — PAIN SCALES - GENERAL
PAINLEVEL_OUTOF10: 0 - NO PAIN

## 2024-05-07 NOTE — PROGRESS NOTES
Subjective:  No acute events. Denies any new complaints. Sitting up in chair eating breakfast this morning.     Objective:  Visit Vitals  /68 (BP Location: Left arm, Patient Position: Sitting)   Pulse 91   Temp 36.6 °C (97.9 °F) (Temporal)   Resp 18      Physical Exam:   General: Alert and cooperative  Cardiovascular: Regular rate and rhythm  Respiratory: Clear to auscultation bilaterally  Gastrointestinal: Abdomen slightly distended, nontender  Neurological: No gross focal neurologic deficits.  Extremities: Warm and dry       Lab Results   Component Value Date    WBC 6.0 05/05/2024    HGB 14.0 05/05/2024    HCT 40.6 (L) 05/05/2024    MCV 85 05/05/2024     05/05/2024      Lab Results   Component Value Date    GLUCOSE 102 (H) 05/05/2024    CALCIUM 9.1 05/05/2024     05/05/2024    K 4.2 05/05/2024    CO2 28 05/05/2024     05/05/2024    BUN 11 05/05/2024    CREATININE 0.85 05/05/2024      Medications:  divalproex, 500 mg, oral, Daily  hydrOXYzine HCL, 25 mg, oral, BID  melatonin, 6 mg, oral, Nightly  polyethylene glycol, 17 g, oral, Daily  risperiDONE, 0.5 mg, oral, BID  sertraline, 25 mg, oral, Daily       Assessment/ Plan:  24-year-old male with a history of autism, OCD brought to the hospital by mother due to reported aggressive incidents at home.     Acute Medical Conditions  #Autism spectrum disorder  #OCD  -Continue patient's home risperidone, sertraline, hydroxyzine and Depakote ER  -Patient's guanfacine ER not available in formulary, contacted patient's mother and advised her to bring this into the hospital.  Although may refrain from giving this to the patient because he has had no aggressive behavior in the last 10 days while admitted.  Possible that his aggression was secondary to guanfacine  -Patient pending placement for respite care, awaiting Department of developmental services  -Provided patient with activities do throughout the day such as coloring, card games      #Constipation  - daily MiraLAX  -Can stop once patient's symptoms fully resolved     DVT: Ambulation  Diet: Regular  Fluids: None  Electrolytes: None  Dispo: Respite care  Code Status: Full code  Consults: None  Antibiotics: None    Yelena Gregory D.O.  Internal Medicine PGY-3    This is a preliminary note with physician attestation to follow.

## 2024-05-07 NOTE — PROGRESS NOTES
Per DSC, PASRR is still under review and pending.  SW will continue to follow up with DSC on status.    LUL STARKS LCSW

## 2024-05-07 NOTE — CARE PLAN
The patient's goals for the shift include jackie    The clinical goals for the shift include Safety

## 2024-05-08 PROCEDURE — G0378 HOSPITAL OBSERVATION PER HR: HCPCS

## 2024-05-08 PROCEDURE — 99231 SBSQ HOSP IP/OBS SF/LOW 25: CPT | Performed by: STUDENT IN AN ORGANIZED HEALTH CARE EDUCATION/TRAINING PROGRAM

## 2024-05-08 PROCEDURE — 2500000006 HC RX 250 W HCPCS SELF ADMINISTERED DRUGS (ALT 637 FOR ALL PAYERS)

## 2024-05-08 PROCEDURE — 2500000004 HC RX 250 GENERAL PHARMACY W/ HCPCS (ALT 636 FOR OP/ED)

## 2024-05-08 PROCEDURE — 2500000001 HC RX 250 WO HCPCS SELF ADMINISTERED DRUGS (ALT 637 FOR MEDICARE OP)

## 2024-05-08 RX ADMIN — RISPERIDONE 0.5 MG: 0.25 TABLET, FILM COATED ORAL at 20:23

## 2024-05-08 RX ADMIN — HYDROXYZINE HYDROCHLORIDE 25 MG: 25 TABLET ORAL at 20:23

## 2024-05-08 RX ADMIN — POLYETHYLENE GLYCOL 3350 17 G: 17 POWDER, FOR SOLUTION ORAL at 09:34

## 2024-05-08 RX ADMIN — DIVALPROEX SODIUM 500 MG: 250 TABLET, FILM COATED, EXTENDED RELEASE ORAL at 09:34

## 2024-05-08 RX ADMIN — RISPERIDONE 0.5 MG: 0.25 TABLET, FILM COATED ORAL at 09:34

## 2024-05-08 RX ADMIN — SERTRALINE HYDROCHLORIDE 25 MG: 50 TABLET ORAL at 09:34

## 2024-05-08 RX ADMIN — HYDROXYZINE HYDROCHLORIDE 25 MG: 25 TABLET ORAL at 09:34

## 2024-05-08 RX ADMIN — Medication 6 MG: at 20:23

## 2024-05-08 ASSESSMENT — PAIN - FUNCTIONAL ASSESSMENT: PAIN_FUNCTIONAL_ASSESSMENT: 0-10

## 2024-05-08 ASSESSMENT — PAIN SCALES - GENERAL
PAINLEVEL_OUTOF10: 0 - NO PAIN
PAINLEVEL_OUTOF10: 0 - NO PAIN

## 2024-05-08 NOTE — PROGRESS NOTES
Subjective:  No acute events. Denies any new complaints.     Objective:  Visit Vitals  /79 (Patient Position: Sitting)   Pulse 97   Temp 37 °C (98.6 °F)   Resp 16      Physical Exam:   General: Alert and cooperative  Cardiovascular: Regular rate and rhythm  Respiratory: Clear to auscultation bilaterally  Gastrointestinal: Abdomen slightly distended, nontender  Neurological: No gross focal neurologic deficits.  Extremities: Warm and dry       Lab Results   Component Value Date    WBC 6.0 05/05/2024    HGB 14.0 05/05/2024    HCT 40.6 (L) 05/05/2024    MCV 85 05/05/2024     05/05/2024      Lab Results   Component Value Date    GLUCOSE 102 (H) 05/05/2024    CALCIUM 9.1 05/05/2024     05/05/2024    K 4.2 05/05/2024    CO2 28 05/05/2024     05/05/2024    BUN 11 05/05/2024    CREATININE 0.85 05/05/2024      Medications:  divalproex, 500 mg, oral, Daily  hydrOXYzine HCL, 25 mg, oral, BID  melatonin, 6 mg, oral, Nightly  polyethylene glycol, 17 g, oral, Daily  risperiDONE, 0.5 mg, oral, BID  sertraline, 25 mg, oral, Daily       Assessment/ Plan:  24-year-old male with a history of autism, OCD brought to the hospital by mother due to reported aggressive incidents at home.     Acute Medical Conditions  #Autism spectrum disorder  #OCD  -Continue patient's home risperidone, sertraline, hydroxyzine and Depakote ER  -Patient's guanfacine ER not available in formulary, contacted patient's mother and advised her to bring this into the hospital.  Although may refrain from giving this to the patient because he has had no aggressive behavior in the last 10 days while admitted.  Possible that his aggression was secondary to guanfacine  -Patient pending placement for respite care, awaiting Department of developmental services  -Provided patient with activities do throughout the day such as coloring, card games     #Constipation  - daily MiraLAX  -Can stop once patient's symptoms fully resolved     DVT:  Ambulation  Diet: Regular  Fluids: None  Electrolytes: None  Dispo: Respite care  Code Status: Full code  Consults: None  Antibiotics: None    Yelena Gregory D.O.  Internal Medicine PGY-3    This is a preliminary note with physician attestation to follow.

## 2024-05-08 NOTE — PROGRESS NOTES
This  reached out to Gloria in the discharge support center to check on [facility]. She states that she emailed them and they have completed the paperwork for review. We are waiting on the determination results. Gloria reports they have 7-14 days to complete. Will continue to follow for results. Message with questions.  RANJANA Campbell

## 2024-05-09 PROCEDURE — 2500000006 HC RX 250 W HCPCS SELF ADMINISTERED DRUGS (ALT 637 FOR ALL PAYERS)

## 2024-05-09 PROCEDURE — 2500000001 HC RX 250 WO HCPCS SELF ADMINISTERED DRUGS (ALT 637 FOR MEDICARE OP)

## 2024-05-09 PROCEDURE — 2500000001 HC RX 250 WO HCPCS SELF ADMINISTERED DRUGS (ALT 637 FOR MEDICARE OP): Performed by: STUDENT IN AN ORGANIZED HEALTH CARE EDUCATION/TRAINING PROGRAM

## 2024-05-09 PROCEDURE — G0378 HOSPITAL OBSERVATION PER HR: HCPCS

## 2024-05-09 PROCEDURE — 99231 SBSQ HOSP IP/OBS SF/LOW 25: CPT | Performed by: STUDENT IN AN ORGANIZED HEALTH CARE EDUCATION/TRAINING PROGRAM

## 2024-05-09 PROCEDURE — 2500000004 HC RX 250 GENERAL PHARMACY W/ HCPCS (ALT 636 FOR OP/ED)

## 2024-05-09 RX ADMIN — SERTRALINE HYDROCHLORIDE 25 MG: 50 TABLET ORAL at 09:14

## 2024-05-09 RX ADMIN — Medication 5 DROP: at 21:50

## 2024-05-09 RX ADMIN — HYDROXYZINE HYDROCHLORIDE 25 MG: 25 TABLET ORAL at 09:14

## 2024-05-09 RX ADMIN — Medication 6 MG: at 21:50

## 2024-05-09 RX ADMIN — RISPERIDONE 0.5 MG: 0.25 TABLET, FILM COATED ORAL at 21:50

## 2024-05-09 RX ADMIN — POLYETHYLENE GLYCOL 3350 17 G: 17 POWDER, FOR SOLUTION ORAL at 09:15

## 2024-05-09 RX ADMIN — Medication 5 DROP: at 12:26

## 2024-05-09 RX ADMIN — DIVALPROEX SODIUM 500 MG: 250 TABLET, FILM COATED, EXTENDED RELEASE ORAL at 09:14

## 2024-05-09 RX ADMIN — RISPERIDONE 0.5 MG: 0.25 TABLET, FILM COATED ORAL at 09:14

## 2024-05-09 RX ADMIN — HYDROXYZINE HYDROCHLORIDE 25 MG: 25 TABLET ORAL at 21:50

## 2024-05-09 ASSESSMENT — COGNITIVE AND FUNCTIONAL STATUS - GENERAL
DAILY ACTIVITIY SCORE: 24
DAILY ACTIVITIY SCORE: 24
MOBILITY SCORE: 24
MOBILITY SCORE: 24

## 2024-05-09 ASSESSMENT — PAIN SCALES - GENERAL
PAINLEVEL_OUTOF10: 0 - NO PAIN
PAINLEVEL_OUTOF10: 0 - NO PAIN

## 2024-05-09 NOTE — CARE PLAN
Problem: Pain  Goal: My pain/discomfort is manageable  Outcome: Progressing     Problem: Safety  Goal: Patient will be injury free during hospitalization  Outcome: Progressing  Goal: I will remain free of falls  Outcome: Progressing     Problem: Daily Care  Goal: Daily care needs are met  Outcome: Progressing     Problem: Psychosocial Needs  Goal: Demonstrates ability to cope with hospitalization/illness  Outcome: Progressing  Goal: Collaborate with me, my family, and caregiver to identify my specific goals  Outcome: Progressing     Problem: Discharge Barriers  Goal: My discharge needs are met  Outcome: Progressing     Problem: Skin  Goal: Decreased wound size/increased tissue granulation at next dressing change  Outcome: Progressing  Goal: Participates in plan/prevention/treatment measures  Outcome: Progressing  Goal: Prevent/manage excess moisture  Outcome: Progressing  Goal: Prevent/minimize sheer/friction injuries  Outcome: Progressing  Goal: Promote/optimize nutrition  Outcome: Progressing  Goal: Promote skin healing  Outcome: Progressing     Problem: Pain - Adult  Goal: Verbalizes/displays adequate comfort level or baseline comfort level  Outcome: Progressing     Problem: Safety - Adult  Goal: Free from fall injury  Outcome: Progressing     Problem: Discharge Planning  Goal: Discharge to home or other facility with appropriate resources  Outcome: Progressing     Problem: Chronic Conditions and Co-morbidities  Goal: Patient's chronic conditions and co-morbidity symptoms are monitored and maintained or improved  Outcome: Progressing   The patient's goals for the shift include jcakie    The clinical goals for the shift include safety

## 2024-05-09 NOTE — PROGRESS NOTES
Subjective:  Patient endorsing ear fullness/hearing loss in the right ear.    Objective:  Visit Vitals  /59   Pulse 87   Temp 36.4 °C (97.5 °F)   Resp 16      Physical Exam:   General: Alert and cooperative  Cardiovascular: Regular rate and rhythm  Respiratory: Clear to auscultation bilaterally  Gastrointestinal: Abdomen slightly distended, nontender  Neurological: No gross focal neurologic deficits.  Extremities: Warm and dry       Lab Results   Component Value Date    WBC 6.0 05/05/2024    HGB 14.0 05/05/2024    HCT 40.6 (L) 05/05/2024    MCV 85 05/05/2024     05/05/2024      Lab Results   Component Value Date    GLUCOSE 102 (H) 05/05/2024    CALCIUM 9.1 05/05/2024     05/05/2024    K 4.2 05/05/2024    CO2 28 05/05/2024     05/05/2024    BUN 11 05/05/2024    CREATININE 0.85 05/05/2024      Medications:  carbamide peroxide, 5 drop, Right Ear, BID  divalproex, 500 mg, oral, Daily  hydrOXYzine HCL, 25 mg, oral, BID  melatonin, 6 mg, oral, Nightly  polyethylene glycol, 17 g, oral, Daily  risperiDONE, 0.5 mg, oral, BID  sertraline, 25 mg, oral, Daily       Assessment/ Plan:  24-year-old male with a history of autism, OCD brought to the hospital by mother due to reported aggressive incidents at home.     Acute Medical Conditions  #Autism spectrum disorder  #OCD  -Continue patient's home risperidone, sertraline, hydroxyzine and Depakote ER  -Patient's guanfacine ER not available in formulary, contacted patient's mother and advised her to bring this into the hospital.  Although may refrain from giving this to the patient because he has had no aggressive behavior in the last 10 days while admitted.  Possible that his aggression was secondary to guanfacine  -Patient pending placement for respite care, awaiting Department of developmental services  -Provided patient with activities do throughout the day such as coloring, card games    #Right cerumen impaction  -Debrox ordered.  Will continue to  monitor.     #Constipation  -Daily miralax, continue to monitor.     DVT: Ambulation  Diet: Regular  Fluids: None  Electrolytes: None  Dispo: Respite care  Code Status: Full code  Consults: None  Antibiotics: None    Yelena Gregory D.O.  Internal Medicine PGY-3    This is a preliminary note with physician attestation to follow.

## 2024-05-09 NOTE — CARE PLAN
The patient's goals for the shift include jackie    The clinical goals for the shift include Safety      Problem: Pain  Goal: My pain/discomfort is manageable  Outcome: Progressing     Problem: Safety  Goal: Patient will be injury free during hospitalization  Outcome: Progressing  Goal: I will remain free of falls  Outcome: Progressing     Problem: Daily Care  Goal: Daily care needs are met  Outcome: Progressing     Problem: Psychosocial Needs  Goal: Demonstrates ability to cope with hospitalization/illness  Outcome: Progressing  Goal: Collaborate with me, my family, and caregiver to identify my specific goals  Outcome: Progressing     Problem: Discharge Barriers  Goal: My discharge needs are met  Outcome: Progressing     Problem: Skin  Goal: Decreased wound size/increased tissue granulation at next dressing change  Outcome: Progressing  Flowsheets (Taken 5/8/2024 2314)  Decreased wound size/increased tissue granulation at next dressing change: Promote sleep for wound healing  Goal: Participates in plan/prevention/treatment measures  Outcome: Progressing  Flowsheets (Taken 5/8/2024 2314)  Participates in plan/prevention/treatment measures: Elevate heels  Goal: Prevent/manage excess moisture  Outcome: Progressing  Flowsheets (Taken 5/8/2024 2314)  Prevent/manage excess moisture: Moisturize dry skin  Goal: Prevent/minimize sheer/friction injuries  Outcome: Progressing  Flowsheets (Taken 5/8/2024 2314)  Prevent/minimize sheer/friction injuries: Increase activity/out of bed for meals  Goal: Promote/optimize nutrition  Outcome: Progressing  Flowsheets (Taken 5/8/2024 2314)  Promote/optimize nutrition: Consume > 50% meals/supplements  Goal: Promote skin healing  Outcome: Progressing  Flowsheets (Taken 5/8/2024 2314)  Promote skin healing: Assess skin/pad under line(s)/device(s)     Problem: Pain - Adult  Goal: Verbalizes/displays adequate comfort level or baseline comfort level  Outcome: Progressing     Problem: Safety -  Adult  Goal: Free from fall injury  Outcome: Progressing     Problem: Discharge Planning  Goal: Discharge to home or other facility with appropriate resources  Outcome: Progressing     Problem: Chronic Conditions and Co-morbidities  Goal: Patient's chronic conditions and co-morbidity symptoms are monitored and maintained or improved  Outcome: Progressing

## 2024-05-10 PROCEDURE — 2500000004 HC RX 250 GENERAL PHARMACY W/ HCPCS (ALT 636 FOR OP/ED)

## 2024-05-10 PROCEDURE — 2500000006 HC RX 250 W HCPCS SELF ADMINISTERED DRUGS (ALT 637 FOR ALL PAYERS)

## 2024-05-10 PROCEDURE — 2500000001 HC RX 250 WO HCPCS SELF ADMINISTERED DRUGS (ALT 637 FOR MEDICARE OP): Performed by: STUDENT IN AN ORGANIZED HEALTH CARE EDUCATION/TRAINING PROGRAM

## 2024-05-10 PROCEDURE — G0378 HOSPITAL OBSERVATION PER HR: HCPCS

## 2024-05-10 PROCEDURE — 2500000001 HC RX 250 WO HCPCS SELF ADMINISTERED DRUGS (ALT 637 FOR MEDICARE OP)

## 2024-05-10 PROCEDURE — 99232 SBSQ HOSP IP/OBS MODERATE 35: CPT | Performed by: STUDENT IN AN ORGANIZED HEALTH CARE EDUCATION/TRAINING PROGRAM

## 2024-05-10 RX ORDER — AMOXICILLIN 500 MG/1
500 CAPSULE ORAL EVERY 8 HOURS SCHEDULED
Status: DISCONTINUED | OUTPATIENT
Start: 2024-05-10 | End: 2024-05-16 | Stop reason: HOSPADM

## 2024-05-10 RX ADMIN — DIVALPROEX SODIUM 500 MG: 250 TABLET, FILM COATED, EXTENDED RELEASE ORAL at 09:27

## 2024-05-10 RX ADMIN — HYDROXYZINE HYDROCHLORIDE 25 MG: 25 TABLET ORAL at 21:04

## 2024-05-10 RX ADMIN — RISPERIDONE 0.5 MG: 0.25 TABLET, FILM COATED ORAL at 09:28

## 2024-05-10 RX ADMIN — Medication 5 DROP: at 09:28

## 2024-05-10 RX ADMIN — SERTRALINE HYDROCHLORIDE 25 MG: 50 TABLET ORAL at 08:59

## 2024-05-10 RX ADMIN — POLYETHYLENE GLYCOL 3350 17 G: 17 POWDER, FOR SOLUTION ORAL at 09:27

## 2024-05-10 RX ADMIN — RISPERIDONE 0.5 MG: 0.25 TABLET, FILM COATED ORAL at 21:04

## 2024-05-10 RX ADMIN — AMOXICILLIN 500 MG: 500 CAPSULE ORAL at 15:49

## 2024-05-10 RX ADMIN — Medication 6 MG: at 21:04

## 2024-05-10 RX ADMIN — AMOXICILLIN 500 MG: 500 CAPSULE ORAL at 21:04

## 2024-05-10 RX ADMIN — HYDROXYZINE HYDROCHLORIDE 25 MG: 25 TABLET ORAL at 09:27

## 2024-05-10 ASSESSMENT — COGNITIVE AND FUNCTIONAL STATUS - GENERAL
MOBILITY SCORE: 24
DAILY ACTIVITIY SCORE: 24
DAILY ACTIVITIY SCORE: 24
MOBILITY SCORE: 24

## 2024-05-10 ASSESSMENT — PAIN SCALES - GENERAL
PAINLEVEL_OUTOF10: 0 - NO PAIN
PAINLEVEL_OUTOF10: 0 - NO PAIN

## 2024-05-10 NOTE — PROGRESS NOTES
Subjective:  Patient still experiencing right ear fullness with some discomfort.     Objective:  Visit Vitals  /75   Pulse 90   Temp 36.8 °C (98.2 °F)   Resp 18      Physical Exam:   General: Alert and cooperative  Cardiovascular: Regular rate and rhythm  Respiratory: Clear to auscultation bilaterally  Gastrointestinal: Abdomen slightly distended, nontender  Neurological: No gross focal neurologic deficits.  Extremities: Warm and dry       Lab Results   Component Value Date    WBC 6.0 05/05/2024    HGB 14.0 05/05/2024    HCT 40.6 (L) 05/05/2024    MCV 85 05/05/2024     05/05/2024      Lab Results   Component Value Date    GLUCOSE 102 (H) 05/05/2024    CALCIUM 9.1 05/05/2024     05/05/2024    K 4.2 05/05/2024    CO2 28 05/05/2024     05/05/2024    BUN 11 05/05/2024    CREATININE 0.85 05/05/2024      Medications:  amoxicillin, 500 mg, oral, q8h JOHN  carbamide peroxide, 5 drop, Right Ear, BID  divalproex, 500 mg, oral, Daily  hydrOXYzine HCL, 25 mg, oral, BID  melatonin, 6 mg, oral, Nightly  polyethylene glycol, 17 g, oral, Daily  risperiDONE, 0.5 mg, oral, BID  sertraline, 25 mg, oral, Daily       Assessment/ Plan:  24-year-old male with a history of autism, OCD brought to the hospital by mother due to reported aggressive incidents at home.     Acute Medical Conditions  #Autism spectrum disorder  #OCD  -Continue patient's home risperidone, sertraline, hydroxyzine and Depakote ER  -Patient's guanfacine ER not available in formulary, contacted patient's mother and advised her to bring this into the hospital.  Although may refrain from giving this to the patient because he has had no aggressive behavior in the last 10 days while admitted.  Possible that his aggression was secondary to guanfacine  -Patient pending placement for respite care, awaiting Department of developmental services  -Provided patient with activities do throughout the day such as coloring, card games    #R Otitis Media  -Start  Amoxicillin, complete 7 day course. Will continue to monitor.      #Constipation  -Daily miralax, continue to monitor.     DVT: Ambulation  Diet: Regular  Fluids: None  Electrolytes: None  Dispo: Respite care  Code Status: Full code  Consults: None  Antibiotics: None    Yelena Gregory D.O.  Internal Medicine PGY-3    This is a preliminary note with physician attestation to follow.

## 2024-05-10 NOTE — PROGRESS NOTES
This  called and gave patients mother an update on still waiting for PASSR to come back. Mother understanding and requesting another update on MON.  will continue to follow. Message with questions.   RANJANA Campbell

## 2024-05-10 NOTE — CARE PLAN
Problem: Pain  Goal: My pain/discomfort is manageable  Outcome: Progressing     Problem: Safety  Goal: Patient will be injury free during hospitalization  Outcome: Progressing  Goal: I will remain free of falls  Outcome: Progressing     Problem: Daily Care  Goal: Daily care needs are met  Outcome: Progressing     Problem: Psychosocial Needs  Goal: Demonstrates ability to cope with hospitalization/illness  Outcome: Progressing  Goal: Collaborate with me, my family, and caregiver to identify my specific goals  Outcome: Progressing     Problem: Discharge Barriers  Goal: My discharge needs are met  Outcome: Progressing     Problem: Skin  Goal: Decreased wound size/increased tissue granulation at next dressing change  Outcome: Progressing  Goal: Participates in plan/prevention/treatment measures  Outcome: Progressing  Goal: Prevent/manage excess moisture  Outcome: Progressing  Goal: Prevent/minimize sheer/friction injuries  Outcome: Progressing  Goal: Promote/optimize nutrition  Outcome: Progressing  Goal: Promote skin healing  Outcome: Progressing     Problem: Pain - Adult  Goal: Verbalizes/displays adequate comfort level or baseline comfort level  Outcome: Progressing     Problem: Safety - Adult  Goal: Free from fall injury  Outcome: Progressing     Problem: Discharge Planning  Goal: Discharge to home or other facility with appropriate resources  Outcome: Progressing     Problem: Chronic Conditions and Co-morbidities  Goal: Patient's chronic conditions and co-morbidity symptoms are monitored and maintained or improved  Outcome: Progressing   The patient's goals for the shift include jackie    The clinical goals for the shift include safety

## 2024-05-11 PROCEDURE — 99231 SBSQ HOSP IP/OBS SF/LOW 25: CPT | Performed by: STUDENT IN AN ORGANIZED HEALTH CARE EDUCATION/TRAINING PROGRAM

## 2024-05-11 PROCEDURE — 2500000004 HC RX 250 GENERAL PHARMACY W/ HCPCS (ALT 636 FOR OP/ED)

## 2024-05-11 PROCEDURE — 2500000006 HC RX 250 W HCPCS SELF ADMINISTERED DRUGS (ALT 637 FOR ALL PAYERS)

## 2024-05-11 PROCEDURE — G0378 HOSPITAL OBSERVATION PER HR: HCPCS

## 2024-05-11 PROCEDURE — 2500000001 HC RX 250 WO HCPCS SELF ADMINISTERED DRUGS (ALT 637 FOR MEDICARE OP)

## 2024-05-11 PROCEDURE — 2500000001 HC RX 250 WO HCPCS SELF ADMINISTERED DRUGS (ALT 637 FOR MEDICARE OP): Performed by: STUDENT IN AN ORGANIZED HEALTH CARE EDUCATION/TRAINING PROGRAM

## 2024-05-11 RX ADMIN — AMOXICILLIN 500 MG: 500 CAPSULE ORAL at 18:16

## 2024-05-11 RX ADMIN — AMOXICILLIN 500 MG: 500 CAPSULE ORAL at 10:04

## 2024-05-11 RX ADMIN — RISPERIDONE 0.5 MG: 0.25 TABLET, FILM COATED ORAL at 10:04

## 2024-05-11 RX ADMIN — HYDROXYZINE HYDROCHLORIDE 25 MG: 25 TABLET ORAL at 21:25

## 2024-05-11 RX ADMIN — SERTRALINE HYDROCHLORIDE 25 MG: 50 TABLET ORAL at 10:04

## 2024-05-11 RX ADMIN — Medication 6 MG: at 21:24

## 2024-05-11 RX ADMIN — DIVALPROEX SODIUM 500 MG: 250 TABLET, FILM COATED, EXTENDED RELEASE ORAL at 10:04

## 2024-05-11 RX ADMIN — RISPERIDONE 0.5 MG: 0.25 TABLET, FILM COATED ORAL at 21:24

## 2024-05-11 RX ADMIN — HYDROXYZINE HYDROCHLORIDE 25 MG: 25 TABLET ORAL at 10:04

## 2024-05-11 RX ADMIN — POLYETHYLENE GLYCOL 3350 17 G: 17 POWDER, FOR SOLUTION ORAL at 10:04

## 2024-05-11 ASSESSMENT — COGNITIVE AND FUNCTIONAL STATUS - GENERAL
MOBILITY SCORE: 24
MOBILITY SCORE: 24
DAILY ACTIVITIY SCORE: 24
DAILY ACTIVITIY SCORE: 24

## 2024-05-11 ASSESSMENT — PAIN SCALES - GENERAL
PAINLEVEL_OUTOF10: 0 - NO PAIN
PAINLEVEL_OUTOF10: 0 - NO PAIN

## 2024-05-11 ASSESSMENT — PAIN - FUNCTIONAL ASSESSMENT: PAIN_FUNCTIONAL_ASSESSMENT: 0-10

## 2024-05-11 NOTE — PROGRESS NOTES
Subjective:  No overnight events.     Objective:  Visit Vitals  /87 (BP Location: Left arm, Patient Position: Sitting)   Pulse 90   Temp 36.4 °C (97.5 °F) (Temporal)   Resp 18      Physical Exam:   General: Alert and cooperative  Cardiovascular: Regular rate and rhythm  Respiratory: Clear to auscultation bilaterally  Gastrointestinal: Abdomen slightly distended, nontender  Neurological: No gross focal neurologic deficits.  Extremities: Warm and dry       Lab Results   Component Value Date    WBC 6.0 05/05/2024    HGB 14.0 05/05/2024    HCT 40.6 (L) 05/05/2024    MCV 85 05/05/2024     05/05/2024      Lab Results   Component Value Date    GLUCOSE 102 (H) 05/05/2024    CALCIUM 9.1 05/05/2024     05/05/2024    K 4.2 05/05/2024    CO2 28 05/05/2024     05/05/2024    BUN 11 05/05/2024    CREATININE 0.85 05/05/2024      Medications:  amoxicillin, 500 mg, oral, q8h JOHN  divalproex, 500 mg, oral, Daily  hydrOXYzine HCL, 25 mg, oral, BID  melatonin, 6 mg, oral, Nightly  polyethylene glycol, 17 g, oral, Daily  risperiDONE, 0.5 mg, oral, BID  sertraline, 25 mg, oral, Daily       Assessment/ Plan:  24-year-old male with a history of autism, OCD brought to the hospital by mother due to reported aggressive incidents at home.     Acute Medical Conditions  #Autism spectrum disorder  #OCD  -Continue patient's home risperidone, sertraline, hydroxyzine and Depakote ER  -Patient's guanfacine ER not available in formulary, contacted patient's mother and advised her to bring this into the hospital.  Although may refrain from giving this to the patient because he has had no aggressive behavior in the last 10 days while admitted.  Possible that his aggression was secondary to guanfacine  -Patient pending placement for respite care, awaiting Department of developmental services  -Provided patient with activities do throughout the day such as coloring, card games    #R Otitis Media  -Cont Amoxicillin, complete 7 day  course. Will continue to monitor.      #Constipation  -Daily miralax, continue to monitor.     DVT: Ambulation  Diet: Regular  Fluids: None  Electrolytes: None  Dispo: Respite care  Code Status: Full code  Consults: None  Antibiotics: None    Remi Babcock DO   Internal Medicine PGY-3

## 2024-05-11 NOTE — CARE PLAN
Problem: Skin  Goal: Participates in plan/prevention/treatment measures  5/11/2024 1035 by Chelly Bond RN  Outcome: Progressing  5/11/2024 1035 by Chelly Bond RN  Outcome: Not Progressing  Goal: Prevent/manage excess moisture  5/11/2024 1035 by Chelly Bond RN  Outcome: Progressing  5/11/2024 1035 by Chelly Bond RN  Outcome: Not Progressing  Goal: Prevent/minimize sheer/friction injuries  5/11/2024 1035 by Chelly Bond RN  Outcome: Progressing  5/11/2024 1035 by Chelly Bond RN  Outcome: Not Progressing  Goal: Promote/optimize nutrition  5/11/2024 1035 by Chelly Bond RN  Outcome: Progressing  5/11/2024 1035 by Chelly Bond RN  Outcome: Not Progressing     Problem: Pain  Goal: My pain/discomfort is manageable  5/11/2024 1035 by Chelly Bond RN  Outcome: Progressing  5/11/2024 1035 by Chelly Bond RN  Outcome: Not Progressing   The patient's goals for the shift include jackie    The clinical goals for the shift include patient will remain safe and free from falls    Problem: Pain  Goal: My pain/discomfort is manageable  5/11/2024 1035 by Chelly Bond RN  Outcome: Progressing  5/11/2024 1035 by Chelly Bond RN  Outcome: Not Progressing     Problem: Safety  Goal: Patient will be injury free during hospitalization  5/11/2024 1035 by Chelly Bond RN  Outcome: Progressing  5/11/2024 1035 by Chelly Bond RN  Outcome: Not Progressing  Goal: I will remain free of falls  5/11/2024 1035 by Chelly Bond RN  Outcome: Progressing  5/11/2024 1035 by Chelly Bond RN  Outcome: Not Progressing     Problem: Daily Care  Goal: Daily care needs are met  5/11/2024 1035 by Chelly Bond RN  Outcome: Progressing  5/11/2024 1035 by Chelly Bond RN  Outcome: Not Progressing     Problem: Psychosocial Needs  Goal: Demonstrates ability to cope with hospitalization/illness  5/11/2024 1035 by Chelly Bond RN  Outcome: Progressing  5/11/2024 1035 by Chelly Bond,  RN  Outcome: Not Progressing  Goal: Collaborate with me, my family, and caregiver to identify my specific goals  5/11/2024 1035 by Chelly Bond RN  Outcome: Progressing  5/11/2024 1035 by Chelly Bond RN  Outcome: Not Progressing     Problem: Discharge Barriers  Goal: My discharge needs are met  5/11/2024 1035 by Chelly Bond RN  Outcome: Progressing  5/11/2024 1035 by Chelly Bond RN  Outcome: Not Progressing     Problem: Skin  Goal: Participates in plan/prevention/treatment measures  5/11/2024 1035 by Chelly Bond RN  Outcome: Progressing  5/11/2024 1035 by Chelly Bond RN  Outcome: Not Progressing  Goal: Prevent/manage excess moisture  5/11/2024 1035 by Chelly Bond RN  Outcome: Progressing  5/11/2024 1035 by Chelly Bond RN  Outcome: Not Progressing  Goal: Prevent/minimize sheer/friction injuries  5/11/2024 1035 by Chelly Bond RN  Outcome: Progressing  5/11/2024 1035 by Chelly Bond RN  Outcome: Not Progressing  Goal: Promote/optimize nutrition  5/11/2024 1035 by Chelly Bond RN  Outcome: Progressing  5/11/2024 1035 by Chelly Bond RN  Outcome: Not Progressing     Problem: Pain - Adult  Goal: Verbalizes/displays adequate comfort level or baseline comfort level  5/11/2024 1035 by Chelly Bond RN  Outcome: Progressing  5/11/2024 1035 by Chelly Bond RN  Outcome: Not Progressing     Problem: Safety - Adult  Goal: Free from fall injury  5/11/2024 1035 by Chelly Bond RN  Outcome: Progressing  5/11/2024 1035 by Chelly Bond RN  Outcome: Not Progressing     Problem: Discharge Planning  Goal: Discharge to home or other facility with appropriate resources  5/11/2024 1035 by Chelly Bond RN  Outcome: Progressing  5/11/2024 1035 by Chelly Bond RN  Outcome: Not Progressing     Problem: Chronic Conditions and Co-morbidities  Goal: Patient's chronic conditions and co-morbidity symptoms are monitored and maintained or improved  5/11/2024 1035 by Chelly  NEVILLE Bond  Outcome: Progressing  5/11/2024 1035 by Chelly Bond RN  Outcome: Not Progressing

## 2024-05-12 PROCEDURE — G0378 HOSPITAL OBSERVATION PER HR: HCPCS

## 2024-05-12 PROCEDURE — 99231 SBSQ HOSP IP/OBS SF/LOW 25: CPT | Performed by: STUDENT IN AN ORGANIZED HEALTH CARE EDUCATION/TRAINING PROGRAM

## 2024-05-12 PROCEDURE — 2500000006 HC RX 250 W HCPCS SELF ADMINISTERED DRUGS (ALT 637 FOR ALL PAYERS)

## 2024-05-12 PROCEDURE — 2500000004 HC RX 250 GENERAL PHARMACY W/ HCPCS (ALT 636 FOR OP/ED)

## 2024-05-12 PROCEDURE — 2500000001 HC RX 250 WO HCPCS SELF ADMINISTERED DRUGS (ALT 637 FOR MEDICARE OP)

## 2024-05-12 PROCEDURE — 2500000001 HC RX 250 WO HCPCS SELF ADMINISTERED DRUGS (ALT 637 FOR MEDICARE OP): Performed by: STUDENT IN AN ORGANIZED HEALTH CARE EDUCATION/TRAINING PROGRAM

## 2024-05-12 RX ADMIN — RISPERIDONE 0.5 MG: 0.25 TABLET, FILM COATED ORAL at 21:44

## 2024-05-12 RX ADMIN — HYDROXYZINE HYDROCHLORIDE 25 MG: 25 TABLET ORAL at 21:44

## 2024-05-12 RX ADMIN — AMOXICILLIN 500 MG: 500 CAPSULE ORAL at 17:57

## 2024-05-12 RX ADMIN — SERTRALINE HYDROCHLORIDE 25 MG: 50 TABLET ORAL at 08:55

## 2024-05-12 RX ADMIN — AMOXICILLIN 500 MG: 500 CAPSULE ORAL at 04:37

## 2024-05-12 RX ADMIN — RISPERIDONE 0.5 MG: 0.25 TABLET, FILM COATED ORAL at 09:05

## 2024-05-12 RX ADMIN — POLYETHYLENE GLYCOL 3350 17 G: 17 POWDER, FOR SOLUTION ORAL at 09:06

## 2024-05-12 RX ADMIN — HYDROXYZINE HYDROCHLORIDE 25 MG: 25 TABLET ORAL at 09:05

## 2024-05-12 RX ADMIN — Medication 6 MG: at 21:44

## 2024-05-12 RX ADMIN — DIVALPROEX SODIUM 500 MG: 250 TABLET, FILM COATED, EXTENDED RELEASE ORAL at 09:06

## 2024-05-12 RX ADMIN — AMOXICILLIN 500 MG: 500 CAPSULE ORAL at 12:41

## 2024-05-12 ASSESSMENT — PAIN SCALES - GENERAL: PAINLEVEL_OUTOF10: 0 - NO PAIN

## 2024-05-12 ASSESSMENT — COGNITIVE AND FUNCTIONAL STATUS - GENERAL
DAILY ACTIVITIY SCORE: 24
MOBILITY SCORE: 24

## 2024-05-12 NOTE — PROGRESS NOTES
Subjective:  No new complaints.     Objective:  Visit Vitals  /80 (BP Location: Right arm, Patient Position: Sitting)   Pulse 102   Temp 35.9 °C (96.6 °F) (Temporal)   Resp 18      Physical Exam:   General: Alert and cooperative  Cardiovascular: Regular rate and rhythm  Respiratory: Clear to auscultation bilaterally  Gastrointestinal: Abdomen slightly distended, nontender  Neurological: No gross focal neurologic deficits.  Extremities: Warm and dry     Lab Results   Component Value Date    WBC 6.0 05/05/2024    HGB 14.0 05/05/2024    HCT 40.6 (L) 05/05/2024    MCV 85 05/05/2024     05/05/2024      Lab Results   Component Value Date    GLUCOSE 102 (H) 05/05/2024    CALCIUM 9.1 05/05/2024     05/05/2024    K 4.2 05/05/2024    CO2 28 05/05/2024     05/05/2024    BUN 11 05/05/2024    CREATININE 0.85 05/05/2024      Medications:  amoxicillin, 500 mg, oral, q8h JOHN  divalproex, 500 mg, oral, Daily  hydrOXYzine HCL, 25 mg, oral, BID  melatonin, 6 mg, oral, Nightly  polyethylene glycol, 17 g, oral, Daily  risperiDONE, 0.5 mg, oral, BID  sertraline, 25 mg, oral, Daily       Assessment/ Plan:  24-year-old male with a history of autism, OCD brought to the hospital by mother due to reported aggressive incidents at home.     Acute Medical Conditions  #Autism spectrum disorder  #OCD  -Continue patient's home risperidone, sertraline, hydroxyzine and Depakote ER  -Patient's guanfacine ER not available in formulary, contacted patient's mother and advised her to bring this into the hospital.  Although may refrain from giving this to the patient because he has had no aggressive behavior in the last 10 days while admitted.  Possible that his aggression was secondary to guanfacine  -Patient pending placement for respite care, awaiting Department of developmental services  -Provided patient with activities do throughout the day such as coloring, card games    #R Otitis Media  -Cont Amoxicillin (day 2/7). Will  continue to monitor.      #Constipation  -Daily miralax, continue to monitor.     DVT: Ambulation  Diet: Regular  Fluids: None  Electrolytes: None  Dispo: Respite care  Code Status: Full code  Consults: None  Antibiotics: None    Remi Babcock DO   Internal Medicine PGY-3

## 2024-05-12 NOTE — CARE PLAN
Problem: Pain  Goal: My pain/discomfort is manageable  Outcome: Progressing     Problem: Safety  Goal: Patient will be injury free during hospitalization  Outcome: Progressing  Goal: I will remain free of falls  Outcome: Progressing     Problem: Daily Care  Goal: Daily care needs are met  Outcome: Progressing     Problem: Psychosocial Needs  Goal: Demonstrates ability to cope with hospitalization/illness  Outcome: Progressing  Goal: Collaborate with me, my family, and caregiver to identify my specific goals  Outcome: Progressing     Problem: Discharge Barriers  Goal: My discharge needs are met  Outcome: Progressing     Problem: Skin  Goal: Participates in plan/prevention/treatment measures  Outcome: Progressing  Goal: Prevent/manage excess moisture  Outcome: Progressing  Goal: Prevent/minimize sheer/friction injuries  Outcome: Progressing  Goal: Promote/optimize nutrition  Outcome: Progressing     Problem: Pain - Adult  Goal: Verbalizes/displays adequate comfort level or baseline comfort level  Outcome: Progressing     Problem: Safety - Adult  Goal: Free from fall injury  Outcome: Progressing     Problem: Discharge Planning  Goal: Discharge to home or other facility with appropriate resources  Outcome: Progressing     Problem: Chronic Conditions and Co-morbidities  Goal: Patient's chronic conditions and co-morbidity symptoms are monitored and maintained or improved  Outcome: Progressing   The patient's goals for the shift include jackie    The clinical goals for the shift include safety

## 2024-05-13 PROBLEM — H66.90 ACUTE OTITIS MEDIA: Status: ACTIVE | Noted: 2024-05-13

## 2024-05-13 PROCEDURE — G0378 HOSPITAL OBSERVATION PER HR: HCPCS

## 2024-05-13 PROCEDURE — 2500000001 HC RX 250 WO HCPCS SELF ADMINISTERED DRUGS (ALT 637 FOR MEDICARE OP)

## 2024-05-13 PROCEDURE — 99238 HOSP IP/OBS DSCHRG MGMT 30/<: CPT | Performed by: STUDENT IN AN ORGANIZED HEALTH CARE EDUCATION/TRAINING PROGRAM

## 2024-05-13 PROCEDURE — 2500000006 HC RX 250 W HCPCS SELF ADMINISTERED DRUGS (ALT 637 FOR ALL PAYERS)

## 2024-05-13 PROCEDURE — 2500000004 HC RX 250 GENERAL PHARMACY W/ HCPCS (ALT 636 FOR OP/ED)

## 2024-05-13 PROCEDURE — 2500000001 HC RX 250 WO HCPCS SELF ADMINISTERED DRUGS (ALT 637 FOR MEDICARE OP): Performed by: STUDENT IN AN ORGANIZED HEALTH CARE EDUCATION/TRAINING PROGRAM

## 2024-05-13 RX ORDER — HYDROXYZINE HYDROCHLORIDE 25 MG/1
25 TABLET, FILM COATED ORAL 2 TIMES DAILY
Start: 2024-05-13

## 2024-05-13 RX ORDER — SERTRALINE HYDROCHLORIDE 50 MG/1
25 TABLET, FILM COATED ORAL
Qty: 15 TABLET | Refills: 0 | Status: SHIPPED | OUTPATIENT
Start: 2024-05-13 | End: 2024-06-12

## 2024-05-13 RX ORDER — AMOXICILLIN 500 MG/1
500 CAPSULE ORAL EVERY 8 HOURS SCHEDULED
Start: 2024-05-13 | End: 2024-05-16

## 2024-05-13 RX ADMIN — RISPERIDONE 0.5 MG: 0.25 TABLET, FILM COATED ORAL at 08:49

## 2024-05-13 RX ADMIN — DIPHENHYDRAMINE HYDROCHLORIDE 50 MG: 25 CAPSULE ORAL at 23:45

## 2024-05-13 RX ADMIN — Medication 6 MG: at 22:44

## 2024-05-13 RX ADMIN — POLYETHYLENE GLYCOL 3350 17 G: 17 POWDER, FOR SOLUTION ORAL at 08:49

## 2024-05-13 RX ADMIN — DIVALPROEX SODIUM 500 MG: 250 TABLET, FILM COATED, EXTENDED RELEASE ORAL at 08:49

## 2024-05-13 RX ADMIN — HYDROXYZINE HYDROCHLORIDE 25 MG: 25 TABLET ORAL at 22:44

## 2024-05-13 RX ADMIN — AMOXICILLIN 500 MG: 500 CAPSULE ORAL at 11:48

## 2024-05-13 RX ADMIN — HYDROXYZINE HYDROCHLORIDE 25 MG: 25 TABLET ORAL at 08:49

## 2024-05-13 RX ADMIN — AMOXICILLIN 500 MG: 500 CAPSULE ORAL at 17:42

## 2024-05-13 RX ADMIN — SERTRALINE HYDROCHLORIDE 25 MG: 50 TABLET ORAL at 08:49

## 2024-05-13 RX ADMIN — RISPERIDONE 0.5 MG: 0.25 TABLET, FILM COATED ORAL at 22:44

## 2024-05-13 ASSESSMENT — COGNITIVE AND FUNCTIONAL STATUS - GENERAL
DAILY ACTIVITIY SCORE: 24
MOBILITY SCORE: 24

## 2024-05-13 ASSESSMENT — PAIN SCALES - GENERAL
PAINLEVEL_OUTOF10: 0 - NO PAIN

## 2024-05-13 ASSESSMENT — PAIN - FUNCTIONAL ASSESSMENT: PAIN_FUNCTIONAL_ASSESSMENT: 0-10

## 2024-05-13 NOTE — PROGRESS NOTES
[facility] determination letter has been received. Diamond started the precert on FRI for respite through [facility]. CANDELARIO updated medical team so they can put in discharge order. Will hope to obtain the precert today.  will continue to follow. Message with questions.  RANJANA Campbell

## 2024-05-13 NOTE — CARE PLAN
Problem: Pain  Goal: My pain/discomfort is manageable  Outcome: Progressing     Problem: Safety  Goal: Patient will be injury free during hospitalization  Outcome: Progressing  Goal: I will remain free of falls  Outcome: Progressing     Problem: Daily Care  Goal: Daily care needs are met  Outcome: Progressing     Problem: Psychosocial Needs  Goal: Demonstrates ability to cope with hospitalization/illness  Outcome: Progressing  Goal: Collaborate with me, my family, and caregiver to identify my specific goals  Outcome: Progressing     Problem: Discharge Barriers  Goal: My discharge needs are met  Outcome: Progressing     Problem: Skin  Goal: Prevent/manage excess moisture  Outcome: Progressing  Goal: Prevent/minimize sheer/friction injuries  Outcome: Progressing  Goal: Promote/optimize nutrition  Outcome: Progressing     Problem: Pain - Adult  Goal: Verbalizes/displays adequate comfort level or baseline comfort level  Outcome: Progressing     Problem: Safety - Adult  Goal: Free from fall injury  Outcome: Progressing     Problem: Discharge Planning  Goal: Discharge to home or other facility with appropriate resources  Outcome: Progressing     Problem: Chronic Conditions and Co-morbidities  Goal: Patient's chronic conditions and co-morbidity symptoms are monitored and maintained or improved  Outcome: Progressing   The patient's goals for the shift include jackie    The clinical goals for the shift include safety

## 2024-05-13 NOTE — DISCHARGE SUMMARY
Discharge diagnosis  #Autism spectrum disorder w/ behavioral disturbance  #OCD  #Acute Right Otitis Media  #Constipation    Follow up regarding    Hospital course  Juliocesar Walker is a 24 y.o. year old male with a history of autism spectrum disorder and OCD who presented to the hospital from home due to combative aggressive behavior towards his family.  He was evaluated by [facility] and it was determined that there was no need for inpatient psychiatric admission.  Social work was consulted for further assistance with placement.  Department of developmental services were contacted and a referral was placed for respite care.  Patient to be discharged to the [facility].  During his stay here he did not exhibit any aggressive behaviors.  He did develop acute otitis media and was treated with antibiotics.    Vitals:    05/13/24 0818   BP: 137/76   Pulse: 103   Resp:    Temp: 36.6 °C (97.9 °F)   SpO2: 95%      Physical exam   General: Alert and cooperative  Cardiovascular: Regular rate and rhythm  Respiratory: Clear to auscultation bilaterally  Gastrointestinal: Abdomen nondistended, nontender  Neurological: No gross focal neurologic deficits.  Extremities: Warm and dry       Home-going medications     Medication List      START taking these medications     amoxicillin 500 mg capsule; Commonly known as: Amoxil; Take 1 capsule   (500 mg) by mouth every 8 hours for 3 days.     CHANGE how you take these medications     guanFACINE 1 mg tablet; Commonly known as: Tenex; What changed: Another   medication with the same name was removed. Continue taking this   medication, and follow the directions you see here.     CONTINUE taking these medications     divalproex 500 mg 24 hr tablet; Commonly known as: Depakote ER   hydrOXYzine HCL 25 mg tablet; Commonly known as: Atarax; Take 1 tablet   (25 mg) by mouth 2 times a day.   Melatin 3 mg tablet; Generic drug: melatonin   risperiDONE 1 mg tablet; Commonly known as: RisperDAL   sertraline 50 mg  tablet; Commonly known as: Zoloft; Take 0.5 tablets (25   mg) by mouth once daily.     STOP taking these medications     FLUoxetine 10 mg capsule; Commonly known as: PROzac       Outpatient follow-up instructions and medication changes  No future appointments.    Yelena Gregory D.O.  Internal Medicine PGY-3

## 2024-05-14 PROCEDURE — 2500000006 HC RX 250 W HCPCS SELF ADMINISTERED DRUGS (ALT 637 FOR ALL PAYERS)

## 2024-05-14 PROCEDURE — G0378 HOSPITAL OBSERVATION PER HR: HCPCS

## 2024-05-14 PROCEDURE — 2500000001 HC RX 250 WO HCPCS SELF ADMINISTERED DRUGS (ALT 637 FOR MEDICARE OP)

## 2024-05-14 PROCEDURE — 99231 SBSQ HOSP IP/OBS SF/LOW 25: CPT | Performed by: STUDENT IN AN ORGANIZED HEALTH CARE EDUCATION/TRAINING PROGRAM

## 2024-05-14 PROCEDURE — 2500000001 HC RX 250 WO HCPCS SELF ADMINISTERED DRUGS (ALT 637 FOR MEDICARE OP): Performed by: STUDENT IN AN ORGANIZED HEALTH CARE EDUCATION/TRAINING PROGRAM

## 2024-05-14 PROCEDURE — 2500000004 HC RX 250 GENERAL PHARMACY W/ HCPCS (ALT 636 FOR OP/ED)

## 2024-05-14 RX ADMIN — SERTRALINE HYDROCHLORIDE 25 MG: 50 TABLET ORAL at 08:46

## 2024-05-14 RX ADMIN — HYDROXYZINE HYDROCHLORIDE 25 MG: 25 TABLET ORAL at 20:44

## 2024-05-14 RX ADMIN — POLYETHYLENE GLYCOL 3350 17 G: 17 POWDER, FOR SOLUTION ORAL at 08:46

## 2024-05-14 RX ADMIN — AMOXICILLIN 500 MG: 500 CAPSULE ORAL at 13:11

## 2024-05-14 RX ADMIN — RISPERIDONE 0.5 MG: 0.25 TABLET, FILM COATED ORAL at 20:44

## 2024-05-14 RX ADMIN — HYDROXYZINE HYDROCHLORIDE 25 MG: 25 TABLET ORAL at 08:46

## 2024-05-14 RX ADMIN — Medication 6 MG: at 20:44

## 2024-05-14 RX ADMIN — DIPHENHYDRAMINE HYDROCHLORIDE 50 MG: 25 CAPSULE ORAL at 20:45

## 2024-05-14 RX ADMIN — AMOXICILLIN 500 MG: 500 CAPSULE ORAL at 06:23

## 2024-05-14 RX ADMIN — RISPERIDONE 0.5 MG: 0.25 TABLET, FILM COATED ORAL at 08:46

## 2024-05-14 RX ADMIN — AMOXICILLIN 500 MG: 500 CAPSULE ORAL at 20:44

## 2024-05-14 RX ADMIN — DIVALPROEX SODIUM 500 MG: 250 TABLET, FILM COATED, EXTENDED RELEASE ORAL at 08:48

## 2024-05-14 ASSESSMENT — COGNITIVE AND FUNCTIONAL STATUS - GENERAL
MOBILITY SCORE: 24
DAILY ACTIVITIY SCORE: 24

## 2024-05-14 ASSESSMENT — PAIN SCALES - GENERAL
PAINLEVEL_OUTOF10: 0 - NO PAIN
PAINLEVEL_OUTOF10: 0 - NO PAIN

## 2024-05-14 ASSESSMENT — PAIN - FUNCTIONAL ASSESSMENT: PAIN_FUNCTIONAL_ASSESSMENT: 0-10

## 2024-05-14 NOTE — PROGRESS NOTES
05/14/24 0855   Discharge Planning   Patient expects to be discharged to: SW sent message to [facility] to see if they were able to get precert. Waiting on response back.

## 2024-05-14 NOTE — NURSING NOTE
Pt complaining of difficulty hearing in the R ear. Resident aware, on atbs for infection no further interventions

## 2024-05-14 NOTE — PROGRESS NOTES
Subjective:  Resting comfortably in bed this morning. No new complaints.     Objective:  Visit Vitals  /80   Pulse 85   Temp 36.4 °C (97.5 °F) (Temporal)   Resp 18      Physical Exam:   General: Alert and cooperative  Cardiovascular: Regular rate and rhythm  Respiratory: Clear to auscultation bilaterally  Gastrointestinal: Abdomen slightly distended, nontender  Neurological: No gross focal neurologic deficits.  Extremities: Warm and dry     Lab Results   Component Value Date    WBC 6.0 05/05/2024    HGB 14.0 05/05/2024    HCT 40.6 (L) 05/05/2024    MCV 85 05/05/2024     05/05/2024      Lab Results   Component Value Date    GLUCOSE 102 (H) 05/05/2024    CALCIUM 9.1 05/05/2024     05/05/2024    K 4.2 05/05/2024    CO2 28 05/05/2024     05/05/2024    BUN 11 05/05/2024    CREATININE 0.85 05/05/2024      Medications:  amoxicillin, 500 mg, oral, q8h JOHN  divalproex, 500 mg, oral, Daily  hydrOXYzine HCL, 25 mg, oral, BID  melatonin, 6 mg, oral, Nightly  polyethylene glycol, 17 g, oral, Daily  risperiDONE, 0.5 mg, oral, BID  sertraline, 25 mg, oral, Daily       Assessment/ Plan:  24-year-old male with a history of autism, OCD brought to the hospital by mother due to reported aggressive incidents at home.     Acute Medical Conditions  #Autism spectrum disorder w/ behavioral disturbance  #OCD  -Continue patient's home risperidone, sertraline, hydroxyzine and Depakote ER  -Patient's guanfacine ER not available in formulary, contacted patient's mother and advised her to bring this into the hospital.   -Provided patient with activities do throughout the day such as coloring, card games  -Approved  for respite care at [facility]. Delay in discharge due to need for precert.     #Acute Right Otitis Media  -Cont Amoxicillin (day 4/7). Will continue to monitor.      #Constipation  -Daily miralax, continue to monitor.     DVT: Ambulation  Diet: Regular  Fluids: None  Electrolytes: None  Dispo: Respite  care  Code Status: Full code  Consults: None  Antibiotics: None    Yelena Gregory D.O.  Internal Medicine PGY-3    This is a preliminary note with physician attestation to follow.

## 2024-05-15 PROCEDURE — G0378 HOSPITAL OBSERVATION PER HR: HCPCS

## 2024-05-15 PROCEDURE — 2500000001 HC RX 250 WO HCPCS SELF ADMINISTERED DRUGS (ALT 637 FOR MEDICARE OP): Performed by: STUDENT IN AN ORGANIZED HEALTH CARE EDUCATION/TRAINING PROGRAM

## 2024-05-15 PROCEDURE — 2500000001 HC RX 250 WO HCPCS SELF ADMINISTERED DRUGS (ALT 637 FOR MEDICARE OP)

## 2024-05-15 PROCEDURE — 99231 SBSQ HOSP IP/OBS SF/LOW 25: CPT | Performed by: STUDENT IN AN ORGANIZED HEALTH CARE EDUCATION/TRAINING PROGRAM

## 2024-05-15 PROCEDURE — 2500000006 HC RX 250 W HCPCS SELF ADMINISTERED DRUGS (ALT 637 FOR ALL PAYERS)

## 2024-05-15 PROCEDURE — 2500000004 HC RX 250 GENERAL PHARMACY W/ HCPCS (ALT 636 FOR OP/ED)

## 2024-05-15 RX ADMIN — AMOXICILLIN 500 MG: 500 CAPSULE ORAL at 21:00

## 2024-05-15 RX ADMIN — SERTRALINE HYDROCHLORIDE 25 MG: 50 TABLET ORAL at 09:43

## 2024-05-15 RX ADMIN — RISPERIDONE 0.5 MG: 0.25 TABLET, FILM COATED ORAL at 21:09

## 2024-05-15 RX ADMIN — HYDROXYZINE HYDROCHLORIDE 25 MG: 25 TABLET ORAL at 21:09

## 2024-05-15 RX ADMIN — AMOXICILLIN 500 MG: 500 CAPSULE ORAL at 06:50

## 2024-05-15 RX ADMIN — HYDROXYZINE HYDROCHLORIDE 25 MG: 25 TABLET ORAL at 09:43

## 2024-05-15 RX ADMIN — DIVALPROEX SODIUM 500 MG: 250 TABLET, FILM COATED, EXTENDED RELEASE ORAL at 09:42

## 2024-05-15 RX ADMIN — POLYETHYLENE GLYCOL 3350 17 G: 17 POWDER, FOR SOLUTION ORAL at 09:43

## 2024-05-15 RX ADMIN — AMOXICILLIN 500 MG: 500 CAPSULE ORAL at 14:46

## 2024-05-15 RX ADMIN — Medication 6 MG: at 21:09

## 2024-05-15 RX ADMIN — RISPERIDONE 0.5 MG: 0.25 TABLET, FILM COATED ORAL at 09:43

## 2024-05-15 ASSESSMENT — COGNITIVE AND FUNCTIONAL STATUS - GENERAL
MOBILITY SCORE: 24
DAILY ACTIVITIY SCORE: 24
MOBILITY SCORE: 24
DAILY ACTIVITIY SCORE: 24

## 2024-05-15 ASSESSMENT — PAIN SCALES - GENERAL
PAINLEVEL_OUTOF10: 0 - NO PAIN
PAINLEVEL_OUTOF10: 0 - NO PAIN

## 2024-05-15 NOTE — PROGRESS NOTES
05/15/24 0935   Discharge Planning   Patient expects to be discharged to: Waiting on precert for respite care at [facility]. SW received message from [facility] asking about group home placement for pt. CANDELARIO called and spoke with pt mother Francisca who states that they are not looking at group home placement right now. Pt mother states that they are looking into day care programs for pt and hope to take pt home with them after respite stay. CANDELARIO updated [facility].

## 2024-05-15 NOTE — PROGRESS NOTES
Subjective:  Ambulating in hallway. No new complaints.     Objective:  Visit Vitals  /53   Pulse 87   Temp 36.9 °C (98.4 °F)   Resp 18      Physical Exam:   General: Alert and cooperative  Cardiovascular: Regular rate and rhythm  Respiratory: Clear to auscultation bilaterally  Gastrointestinal: Abdomen slightly distended, nontender  Neurological: No gross focal neurologic deficits.  Extremities: Warm and dry     Lab Results   Component Value Date    WBC 6.0 05/05/2024    HGB 14.0 05/05/2024    HCT 40.6 (L) 05/05/2024    MCV 85 05/05/2024     05/05/2024      Lab Results   Component Value Date    GLUCOSE 102 (H) 05/05/2024    CALCIUM 9.1 05/05/2024     05/05/2024    K 4.2 05/05/2024    CO2 28 05/05/2024     05/05/2024    BUN 11 05/05/2024    CREATININE 0.85 05/05/2024      Medications:  amoxicillin, 500 mg, oral, q8h JOHN  divalproex, 500 mg, oral, Daily  hydrOXYzine HCL, 25 mg, oral, BID  melatonin, 6 mg, oral, Nightly  polyethylene glycol, 17 g, oral, Daily  risperiDONE, 0.5 mg, oral, BID  sertraline, 25 mg, oral, Daily       Assessment/ Plan:  24-year-old male with a history of autism, OCD brought to the hospital by mother due to reported aggressive incidents at home.     Acute Medical Conditions  #Autism spectrum disorder w/ behavioral disturbance  #OCD  -Continue patient's home risperidone, sertraline, hydroxyzine and Depakote ER  -Patient's guanfacine ER not available in formulary, contacted patient's mother and advised her to bring this into the hospital.   -Provided patient with activities do throughout the day such as coloring, card games  -Approved  for respite care at [facility]. Delay in discharge due to need for precert.     #Acute Right Otitis Media  -Cont Amoxicillin (day 5/7). Will continue to monitor.      #Constipation  -Daily miralax, continue to monitor.     DVT: Ambulation  Diet: Regular  Fluids: None  Electrolytes: None  Dispo: Respite care  Code Status: Full  code  Consults: None  Antibiotics: None    Yelena Gregory D.O.  Internal Medicine PGY-3    This is a preliminary note with physician attestation to follow.

## 2024-05-16 VITALS
WEIGHT: 176.37 LBS | DIASTOLIC BLOOD PRESSURE: 53 MMHG | RESPIRATION RATE: 18 BRPM | BODY MASS INDEX: 30.11 KG/M2 | OXYGEN SATURATION: 95 % | TEMPERATURE: 97.9 F | HEART RATE: 97 BPM | HEIGHT: 64 IN | SYSTOLIC BLOOD PRESSURE: 107 MMHG

## 2024-05-16 PROCEDURE — 2500000001 HC RX 250 WO HCPCS SELF ADMINISTERED DRUGS (ALT 637 FOR MEDICARE OP): Performed by: STUDENT IN AN ORGANIZED HEALTH CARE EDUCATION/TRAINING PROGRAM

## 2024-05-16 PROCEDURE — 2500000001 HC RX 250 WO HCPCS SELF ADMINISTERED DRUGS (ALT 637 FOR MEDICARE OP)

## 2024-05-16 PROCEDURE — 2500000006 HC RX 250 W HCPCS SELF ADMINISTERED DRUGS (ALT 637 FOR ALL PAYERS)

## 2024-05-16 PROCEDURE — 99231 SBSQ HOSP IP/OBS SF/LOW 25: CPT | Performed by: STUDENT IN AN ORGANIZED HEALTH CARE EDUCATION/TRAINING PROGRAM

## 2024-05-16 PROCEDURE — 2500000004 HC RX 250 GENERAL PHARMACY W/ HCPCS (ALT 636 FOR OP/ED)

## 2024-05-16 PROCEDURE — G0378 HOSPITAL OBSERVATION PER HR: HCPCS

## 2024-05-16 RX ADMIN — AMOXICILLIN 500 MG: 500 CAPSULE ORAL at 06:48

## 2024-05-16 RX ADMIN — POLYETHYLENE GLYCOL 3350 17 G: 17 POWDER, FOR SOLUTION ORAL at 09:21

## 2024-05-16 RX ADMIN — DIVALPROEX SODIUM 500 MG: 250 TABLET, FILM COATED, EXTENDED RELEASE ORAL at 09:00

## 2024-05-16 RX ADMIN — HYDROXYZINE HYDROCHLORIDE 25 MG: 25 TABLET ORAL at 09:21

## 2024-05-16 RX ADMIN — AMOXICILLIN 500 MG: 500 CAPSULE ORAL at 15:43

## 2024-05-16 RX ADMIN — RISPERIDONE 0.5 MG: 0.25 TABLET, FILM COATED ORAL at 09:21

## 2024-05-16 RX ADMIN — SERTRALINE HYDROCHLORIDE 25 MG: 50 TABLET ORAL at 09:21

## 2024-05-16 ASSESSMENT — COGNITIVE AND FUNCTIONAL STATUS - GENERAL
DAILY ACTIVITIY SCORE: 24
MOBILITY SCORE: 24

## 2024-05-16 ASSESSMENT — PAIN SCALES - GENERAL: PAINLEVEL_OUTOF10: 0 - NO PAIN

## 2024-05-16 NOTE — PROGRESS NOTES
Precert has been obtained for patient to go to respite at [facility]. Transport scheduled for 5 pm. SW called and left voicemail for mother. SW sent report # and  time to RN.  will continue to follow. Message with questions.  RANJANA Campbell

## 2024-05-16 NOTE — CARE PLAN
The patient's goals for the shift include jackie    The clinical goals for the shift include safety      Skin  Add All  Prevent/manage excess moisture  Add  Today at 2337 - Progressing by Dian Matthews RN  Add  Prevent/minimize sheer/friction injuries  Add  Today at 2337 - Progressing by Dian Matthews RN  Add  Promote/optimize nutrition  Add  Today at 2337 - Progressing by Dian Matthews RN  Add  Pain  Add All  My pain/discomfort is manageable  Add  Today at 2337 - Progressing by Dian Matthews RN  Add  Safety  Add All  Patient will be injury free during hospitalization  Add  Today at 2337 - Progressing by Dian Matthews RN  Add  I will remain free of falls  Add  Today at 2337 - Progressing by Dian Matthews RN  Add  Daily Care  Add All  Daily care needs are met  Add  Today at 2337 - Progressing by Dian Matthews RN  Add  Psychosocial Needs  Add All  Demonstrates ability to cope with hospitalization/illness  Add  Today at 2337 - Progressing by Dian Matthews RN  Add  Collaborate with me, my family, and caregiver to identify my specific goals  Add  Today at 2337 - Progressing by Dian Matthews RN  Add  Discharge Barriers  Add All  My discharge needs are met  Add  Today at 2337 - Progressing by Dian Matthews RN  Add  Pain - Adult  Add All  Verbalizes/displays adequate comfort level or baseline comfort level  Add  Today at 2337 - Progressing by Dian Matthews RN  Add  Safety - Adult  Add All  Free from fall injury  Add  Today at 2337 - Progressing by Dian Matthews RN  Add  Discharge Planning  Add All  Discharge to home or other facility with appropriate resources  Add  Today at 2337 - Progressing by Dian Matthews RN  Add  Chronic Conditions and Co-morbidities  Add All  Patient's chronic conditions and co-morbidity symptoms are monitored and maintained or improved  Add  Today at 2337 - Progressing by Dian Matthews RN

## 2024-05-16 NOTE — PROGRESS NOTES
Subjective:  Resting comfortably in bed. No new complaints.    Objective:  Visit Vitals  /69   Pulse 92   Temp 36.8 °C (98.2 °F)   Resp 18      Physical Exam:   General: Alert and cooperative  Cardiovascular: Regular rate and rhythm  Respiratory: Clear to auscultation bilaterally  Gastrointestinal: Abdomen slightly distended, nontender  Neurological: No gross focal neurologic deficits.  Extremities: Warm and dry     Lab Results   Component Value Date    WBC 6.0 05/05/2024    HGB 14.0 05/05/2024    HCT 40.6 (L) 05/05/2024    MCV 85 05/05/2024     05/05/2024      Lab Results   Component Value Date    GLUCOSE 102 (H) 05/05/2024    CALCIUM 9.1 05/05/2024     05/05/2024    K 4.2 05/05/2024    CO2 28 05/05/2024     05/05/2024    BUN 11 05/05/2024    CREATININE 0.85 05/05/2024      Medications:  amoxicillin, 500 mg, oral, q8h JOHN  divalproex, 500 mg, oral, Daily  hydrOXYzine HCL, 25 mg, oral, BID  melatonin, 6 mg, oral, Nightly  polyethylene glycol, 17 g, oral, Daily  risperiDONE, 0.5 mg, oral, BID  sertraline, 25 mg, oral, Daily       Assessment/ Plan:  24-year-old male with a history of autism, OCD brought to the hospital by mother due to reported aggressive incidents at home.     Acute Medical Conditions  #Autism spectrum disorder w/ behavioral disturbance  #OCD  -Continue patient's home risperidone, sertraline, hydroxyzine and Depakote ER  -Patient's guanfacine ER not available in formulary, contacted patient's mother and advised her to bring this into the hospital.   -Provided patient with activities do throughout the day such as coloring, card games  -Approved  for respite care at [facility]. Delay in discharge due to need for precert.     #Acute Right Otitis Media  -Cont Amoxicillin (day 6/7). Will continue to monitor.      #Constipation  -Daily miralax, continue to monitor.     DVT: Ambulation  Diet: Regular  Fluids: None  Electrolytes: None  Dispo: Respite care  Code Status: Full  code  Consults: None  Antibiotics: None    Yelena Gregory D.O.  Internal Medicine PGY-3    This is a preliminary note with physician attestation to follow.